# Patient Record
Sex: FEMALE | Race: WHITE | NOT HISPANIC OR LATINO | URBAN - METROPOLITAN AREA
[De-identification: names, ages, dates, MRNs, and addresses within clinical notes are randomized per-mention and may not be internally consistent; named-entity substitution may affect disease eponyms.]

---

## 2023-11-13 ENCOUNTER — OFFICE VISIT (OUTPATIENT)
Dept: FAMILY MEDICINE CLINIC | Facility: CLINIC | Age: 48
End: 2023-11-13
Payer: COMMERCIAL

## 2023-11-13 VITALS
HEIGHT: 66 IN | OXYGEN SATURATION: 98 % | WEIGHT: 180 LBS | SYSTOLIC BLOOD PRESSURE: 124 MMHG | DIASTOLIC BLOOD PRESSURE: 72 MMHG | RESPIRATION RATE: 16 BRPM | BODY MASS INDEX: 28.93 KG/M2 | HEART RATE: 82 BPM

## 2023-11-13 DIAGNOSIS — R55 SYNCOPE WITHOUT OTHER CARDIOVASCULAR SYMPTOMS: ICD-10-CM

## 2023-11-13 DIAGNOSIS — Z00.00 ENCOUNTER FOR ANNUAL PHYSICAL EXAM: Primary | ICD-10-CM

## 2023-11-13 DIAGNOSIS — E78.2 MIXED HYPERLIPIDEMIA: ICD-10-CM

## 2023-11-13 DIAGNOSIS — H81.12 BENIGN PAROXYSMAL POSITIONAL VERTIGO OF LEFT EAR: ICD-10-CM

## 2023-11-13 DIAGNOSIS — R09.89 SYNCOPE WITHOUT OTHER CARDIOVASCULAR SYMPTOMS: ICD-10-CM

## 2023-11-13 DIAGNOSIS — Z12.31 ENCOUNTER FOR SCREENING MAMMOGRAM FOR BREAST CANCER: ICD-10-CM

## 2023-11-13 DIAGNOSIS — E04.1 NODULE OF RIGHT LOBE OF THYROID GLAND: ICD-10-CM

## 2023-11-13 DIAGNOSIS — H81.13 BPV (BENIGN POSITIONAL VERTIGO), BILATERAL: ICD-10-CM

## 2023-11-13 PROBLEM — I49.9 CARDIAC DYSRHYTHMIA: Status: ACTIVE | Noted: 2023-11-13

## 2023-11-13 PROBLEM — Z90.710 H/O TOTAL HYSTERECTOMY: Status: ACTIVE | Noted: 2023-11-13

## 2023-11-13 PROBLEM — Z98.1 HISTORY OF FUSION OF CERVICAL SPINE: Status: ACTIVE | Noted: 2023-11-13

## 2023-11-13 PROCEDURE — 99203 OFFICE O/P NEW LOW 30 MIN: CPT | Performed by: FAMILY MEDICINE

## 2023-11-13 PROCEDURE — 99386 PREV VISIT NEW AGE 40-64: CPT | Performed by: FAMILY MEDICINE

## 2023-11-13 RX ORDER — MECLIZINE HCL 12.5 MG/1
12.5 TABLET ORAL 3 TIMES DAILY PRN
Qty: 30 TABLET | Refills: 1 | Status: SHIPPED | OUTPATIENT
Start: 2023-11-13

## 2023-11-13 RX ORDER — ATORVASTATIN CALCIUM 20 MG/1
20 TABLET, FILM COATED ORAL DAILY
COMMUNITY
Start: 2023-10-17

## 2023-11-13 NOTE — ASSESSMENT & PLAN NOTE
Patient has not had recent episodes.   Reportedly she had previous episodes which seem to be stress related and was told that her cardiac work-up was unremarkable even though she had occasional "skipped beats"    -We will have to request records from prior PCP as well as prior cardiac work-up to review

## 2023-11-13 NOTE — PROGRESS NOTES
Subjective:      Patient ID: Jossie Ferrera is a 46 y.o. female. 47-year-old female presents as new patient to the practice for annual physical examination as well as treatment of chronic conditions. Patient presents with her . Recently moved to the area from [place]. Patient is a former  with [employer]. She does have history of work-related trauma which resulted in cervical fusion. She eventually was working in [employer] instead of being on the streets but they had to retire from that line of work and moved to this area. She has somewhat vague history of having a right sided thyroid nodule that was reportedly too close to her carotid artery so they did not perform biopsy but were only following it with serial ultrasounds and reportedly it has not changed in size. Patient has history of total hysterectomy. In need of mammogram.  She does state that she will have episodes of vertigo with change in position and looking to the left side. Apparently her prior PCP had sent her for physical therapy and she was doing home therapy treatments with her own Frankey Fermo maneuvers but still ends up with episodes of nausea and dizziness with change in position. Patient does have history of hyperlipidemia and is on atorvastatin. Close to 1 year since her blood work. Patient reportedly had episodes of syncope sometime ago while in [place] and was wearing a Holter monitor. Reportedly she had some skipped beats but was otherwise told that her heart was "okay". No recent episodes. She felt that some of those episodes were triggered by stress as her  does have significant mental health issues        No past medical history on file. No family history on file. No past surgical history on file. reports that she has quit smoking. Her smoking use included cigarettes. She does not have any smokeless tobacco history on file.  She reports current alcohol use. She reports that she does not use drugs. Current Outpatient Medications:   •  atorvastatin (LIPITOR) 20 mg tablet, Take 20 mg by mouth daily, Disp: , Rfl:   •  meclizine (ANTIVERT) 12.5 MG tablet, Take 1 tablet (12.5 mg total) by mouth 3 (three) times a day as needed for dizziness, Disp: 30 tablet, Rfl: 1    The following portions of the patient's history were reviewed and updated as appropriate: allergies, current medications, past family history, past medical history, past social history, past surgical history and problem list.    Review of Systems   Constitutional: Negative. HENT: Negative. Eyes: Negative. Respiratory: Negative. Cardiovascular: Negative. Gastrointestinal: Negative. Endocrine: Negative. Genitourinary: Negative. Musculoskeletal: Negative. Decreased range of motion of the neck with fusion   Skin: Negative. Allergic/Immunologic: Negative. Neurological:  Positive for dizziness (With change in position) and light-headedness. Hematological: Negative. Psychiatric/Behavioral: Negative. Objective:    /72   Pulse 82   Resp 16   Ht 5' 6" (1.676 m)   Wt 81.6 kg (180 lb)   SpO2 98%   BMI 29.05 kg/m²      Physical Exam  Vitals and nursing note reviewed. Constitutional:       General: She is not in acute distress. Appearance: Normal appearance. She is well-developed and normal weight. She is not diaphoretic. HENT:      Head: Normocephalic and atraumatic. Right Ear: Tympanic membrane, ear canal and external ear normal.      Left Ear: Tympanic membrane, ear canal and external ear normal.      Nose: Nose normal.   Eyes:      Extraocular Movements: Extraocular movements intact. Conjunctiva/sclera: Conjunctivae normal.      Pupils: Pupils are equal, round, and reactive to light. Cardiovascular:      Rate and Rhythm: Normal rate and regular rhythm. Heart sounds: Normal heart sounds. No murmur heard.   Pulmonary: Effort: Pulmonary effort is normal.      Breath sounds: Normal breath sounds. Abdominal:      General: Bowel sounds are normal.      Palpations: Abdomen is soft. Musculoskeletal:         General: Normal range of motion. Cervical back: Normal range of motion and neck supple. Skin:     General: Skin is warm and dry. Findings: No rash. Neurological:      General: No focal deficit present. Mental Status: She is alert and oriented to person, place, and time. Mental status is at baseline. Deep Tendon Reflexes: Reflexes are normal and symmetric. Comments: Patient does have positive Hallpike Nikhil maneuver with looking to the left with left-sided nystagmus   Psychiatric:         Mood and Affect: Mood normal.         Behavior: Behavior normal.         Thought Content: Thought content normal.         Judgment: Judgment normal.           No results found for this or any previous visit (from the past 1008 hour(s)). Assessment/Plan:    Nodule of right lobe of thyroid gland  We will need to obtain prior ultrasound reports. Patient will be sent for ultrasound of the thyroid gland to determine new baseline    Benign paroxysmal positional vertigo of left ear  BPV of left ear. Easily fatigable after performing Hallpike Pyrites maneuver and looking to the left one time. We will place the patient on as needed meclizine as I do request records from prior PCP for work-up that had been done    Mixed hyperlipidemia  On atorvastatin 20 mg once daily. Check lipid panel. I will assume management of her atorvastatin    Encounter for screening mammogram for breast cancer  Screening mammogram ordered    Encounter for annual physical exam  Screening blood work ordered including CBC, TSH. She does not need Pap smear as she reportedly had a total hysterectomy    Syncope without other cardiovascular symptoms  Patient has not had recent episodes.   Reportedly she had previous episodes which seem to be stress related and was told that her cardiac work-up was unremarkable even though she had occasional "skipped beats"    -We will have to request records from prior PCP as well as prior cardiac work-up to review          Problem List Items Addressed This Visit        Endocrine    Nodule of right lobe of thyroid gland     We will need to obtain prior ultrasound reports. Patient will be sent for ultrasound of the thyroid gland to determine new baseline         Relevant Orders    TSH, 3rd generation with Free T4 reflex    US thyroid       Nervous and Auditory    Benign paroxysmal positional vertigo of left ear     BPV of left ear. Easily fatigable after performing Hallpike Melcroft maneuver and looking to the left one time. We will place the patient on as needed meclizine as I do request records from prior PCP for work-up that had been done         Relevant Medications    meclizine (ANTIVERT) 12.5 MG tablet       Other    Encounter for annual physical exam - Primary     Screening blood work ordered including CBC, TSH. She does not need Pap smear as she reportedly had a total hysterectomy         Relevant Orders    CBC and differential    Encounter for screening mammogram for breast cancer     Screening mammogram ordered         Relevant Orders    Mammo screening bilateral w cad    Mixed hyperlipidemia     On atorvastatin 20 mg once daily. Check lipid panel. I will assume management of her atorvastatin         Relevant Medications    atorvastatin (LIPITOR) 20 mg tablet    Other Relevant Orders    Comprehensive metabolic panel    Lipid panel    Syncope without other cardiovascular symptoms     Patient has not had recent episodes.   Reportedly she had previous episodes which seem to be stress related and was told that her cardiac work-up was unremarkable even though she had occasional "skipped beats"    -We will have to request records from prior PCP as well as prior cardiac work-up to review

## 2023-11-13 NOTE — ASSESSMENT & PLAN NOTE
Screening blood work ordered including CBC, TSH.   She does not need Pap smear as she reportedly had a total hysterectomy

## 2023-11-13 NOTE — ASSESSMENT & PLAN NOTE
We will need to obtain prior ultrasound reports.   Patient will be sent for ultrasound of the thyroid gland to determine new baseline

## 2023-11-13 NOTE — ASSESSMENT & PLAN NOTE
BPV of left ear. Easily fatigable after performing Hallpike Hartford maneuver and looking to the left one time.   We will place the patient on as needed meclizine as I do request records from prior PCP for work-up that had been done

## 2023-11-20 ENCOUNTER — HOSPITAL ENCOUNTER (OUTPATIENT)
Dept: ULTRASOUND IMAGING | Facility: HOSPITAL | Age: 48
Discharge: HOME | End: 2023-11-20
Payer: COMMERCIAL

## 2023-11-20 DIAGNOSIS — E04.1 NODULE OF RIGHT LOBE OF THYROID GLAND: ICD-10-CM

## 2023-11-20 PROCEDURE — 76536 US EXAM OF HEAD AND NECK: CPT

## 2023-11-27 ENCOUNTER — TELEPHONE (OUTPATIENT)
Dept: FAMILY MEDICINE CLINIC | Facility: CLINIC | Age: 48
End: 2023-11-27

## 2023-11-27 NOTE — TELEPHONE ENCOUNTER
----- Message from Shannan Espana DO sent at 11/27/2023  8:52 AM EST -----  Please call the patient regarding her abnormal result. Thyroid ultrasound shows 2 left-sided thyroid nodules neither of which meet criteria for biopsy.   She is recommended to have repeat thyroid ultrasound performed in 1 year to continue to monitor

## 2023-11-30 LAB
ALBUMIN SERPL-MCNC: 4.6 G/DL (ref 3.6–5.1)
ALBUMIN/GLOB SERPL: 2 (CALC) (ref 1–2.5)
ALP SERPL-CCNC: 60 U/L (ref 37–153)
ALT SERPL-CCNC: 25 U/L (ref 6–29)
AST SERPL-CCNC: 19 U/L (ref 10–35)
BASOPHILS # BLD AUTO: 59 CELLS/UL (ref 0–200)
BASOPHILS NFR BLD AUTO: 1 %
BILIRUB SERPL-MCNC: 0.7 MG/DL (ref 0.2–1.2)
BUN SERPL-MCNC: 17 MG/DL (ref 7–25)
BUN/CREAT SERPL: NORMAL (CALC) (ref 6–22)
CALCIUM SERPL-MCNC: 9.5 MG/DL (ref 8.6–10.4)
CHLORIDE SERPL-SCNC: 107 MMOL/L (ref 98–110)
CHOLEST SERPL-MCNC: 167 MG/DL
CHOLEST/HDLC SERPL: 2.9 (CALC)
CO2 SERPL-SCNC: 27 MMOL/L (ref 20–32)
CREAT SERPL-MCNC: 0.83 MG/DL (ref 0.5–1.03)
EOSINOPHIL # BLD AUTO: 130 CELLS/UL (ref 15–500)
EOSINOPHIL NFR BLD AUTO: 2.2 %
ERYTHROCYTE [DISTWIDTH] IN BLOOD BY AUTOMATED COUNT: 13.1 % (ref 11–15)
GFR/BSA.PRED SERPLBLD CYS-BASED-ARV: 86 ML/MIN/1.73M2
GLOBULIN SER CALC-MCNC: 2.3 G/DL (CALC) (ref 1.9–3.7)
GLUCOSE SERPL-MCNC: 99 MG/DL (ref 65–99)
HCT VFR BLD AUTO: 42.6 % (ref 35–45)
HDLC SERPL-MCNC: 58 MG/DL
HGB BLD-MCNC: 14.1 G/DL (ref 11.7–15.5)
LDLC SERPL CALC-MCNC: 85 MG/DL (CALC)
LYMPHOCYTES # BLD AUTO: 1906 CELLS/UL (ref 850–3900)
LYMPHOCYTES NFR BLD AUTO: 32.3 %
MCH RBC QN AUTO: 28.2 PG (ref 27–33)
MCHC RBC AUTO-ENTMCNC: 33.1 G/DL (ref 32–36)
MCV RBC AUTO: 85.2 FL (ref 80–100)
MONOCYTES # BLD AUTO: 507 CELLS/UL (ref 200–950)
MONOCYTES NFR BLD AUTO: 8.6 %
NEUTROPHILS # BLD AUTO: 3298 CELLS/UL (ref 1500–7800)
NEUTROPHILS NFR BLD AUTO: 55.9 %
NONHDLC SERPL-MCNC: 109 MG/DL (CALC)
PLATELET # BLD AUTO: 230 THOUSAND/UL (ref 140–400)
PMV BLD REES-ECKER: 10.7 FL (ref 7.5–12.5)
POTASSIUM SERPL-SCNC: 4.7 MMOL/L (ref 3.5–5.3)
PROT SERPL-MCNC: 6.9 G/DL (ref 6.1–8.1)
RBC # BLD AUTO: 5 MILLION/UL (ref 3.8–5.1)
SODIUM SERPL-SCNC: 140 MMOL/L (ref 135–146)
TRIGL SERPL-MCNC: 138 MG/DL
TSH SERPL-ACNC: 2.29 MIU/L
WBC # BLD AUTO: 5.9 THOUSAND/UL (ref 3.8–10.8)

## 2023-12-14 ENCOUNTER — RA CDI HCC (OUTPATIENT)
Dept: OTHER | Facility: HOSPITAL | Age: 48
End: 2023-12-14

## 2023-12-14 NOTE — PROGRESS NOTES
[address] [facility] coding opportunities       Chart reviewed, no opportunity found: CHART REVIEWED, NO OPPORTUNITY FOUND        Patients Insurance        Commercial Insurance: [employer]

## 2024-01-08 DIAGNOSIS — E78.2 MIXED HYPERLIPIDEMIA: Primary | ICD-10-CM

## 2024-01-08 RX ORDER — ATORVASTATIN CALCIUM 20 MG/1
20 TABLET, FILM COATED ORAL DAILY
Qty: 30 TABLET | Refills: 1 | Status: SHIPPED | OUTPATIENT
Start: 2024-01-08 | End: 2024-01-18 | Stop reason: SDUPTHER

## 2024-01-08 NOTE — TELEPHONE ENCOUNTER
Reason for call:   [x] Refill   [] Prior Auth  [] Other:     Office: patient sees Dr Pierce now [facility]  [x] PCP/Provider - Chaim Pierce,   Family Medicine  [] Specialty/Provider -     Medication:   atorvastatin (LIPITOR) 20 mg tablet   Dose: 20 mg Route: Oral Frequency: Daily  Sig: Take 20 mg by mouth daily      [facility]      Does the patient have enough for 3 days?   [] Yes   [x] No - Send as HP to POD

## 2024-01-18 ENCOUNTER — OFFICE VISIT (OUTPATIENT)
Dept: FAMILY MEDICINE CLINIC | Facility: CLINIC | Age: 49
End: 2024-01-18
Payer: COMMERCIAL

## 2024-01-18 VITALS
HEIGHT: 66 IN | HEART RATE: 75 BPM | DIASTOLIC BLOOD PRESSURE: 80 MMHG | WEIGHT: 180 LBS | OXYGEN SATURATION: 98 % | BODY MASS INDEX: 28.93 KG/M2 | SYSTOLIC BLOOD PRESSURE: 128 MMHG

## 2024-01-18 DIAGNOSIS — Z23 FLU VACCINE NEED: ICD-10-CM

## 2024-01-18 DIAGNOSIS — Z01.419 WELL FEMALE EXAM WITH ROUTINE GYNECOLOGICAL EXAM: ICD-10-CM

## 2024-01-18 DIAGNOSIS — H81.12 BENIGN PAROXYSMAL POSITIONAL VERTIGO OF LEFT EAR: ICD-10-CM

## 2024-01-18 DIAGNOSIS — N94.10 DYSPAREUNIA IN FEMALE: ICD-10-CM

## 2024-01-18 DIAGNOSIS — Z90.710 H/O TOTAL HYSTERECTOMY: ICD-10-CM

## 2024-01-18 DIAGNOSIS — Z23 ENCOUNTER FOR IMMUNIZATION: ICD-10-CM

## 2024-01-18 DIAGNOSIS — E78.2 MIXED HYPERLIPIDEMIA: ICD-10-CM

## 2024-01-18 DIAGNOSIS — E89.40 SURGICAL MENOPAUSE: ICD-10-CM

## 2024-01-18 DIAGNOSIS — Z86.010 PERSONAL HISTORY OF COLONIC POLYPS: ICD-10-CM

## 2024-01-18 DIAGNOSIS — E04.1 NODULE OF RIGHT LOBE OF THYROID GLAND: Primary | ICD-10-CM

## 2024-01-18 PROBLEM — Z86.0100 PERSONAL HISTORY OF COLONIC POLYPS: Status: ACTIVE | Noted: 2024-01-18

## 2024-01-18 PROBLEM — R55 SYNCOPE WITHOUT OTHER CARDIOVASCULAR SYMPTOMS: Status: RESOLVED | Noted: 2023-11-13 | Resolved: 2024-01-18

## 2024-01-18 PROBLEM — R09.89 SYNCOPE WITHOUT OTHER CARDIOVASCULAR SYMPTOMS: Status: RESOLVED | Noted: 2023-11-13 | Resolved: 2024-01-18

## 2024-01-18 PROCEDURE — 99214 OFFICE O/P EST MOD 30 MIN: CPT | Performed by: FAMILY MEDICINE

## 2024-01-18 PROCEDURE — 90471 IMMUNIZATION ADMIN: CPT | Performed by: FAMILY MEDICINE

## 2024-01-18 PROCEDURE — 90686 IIV4 VACC NO PRSV 0.5 ML IM: CPT | Performed by: FAMILY MEDICINE

## 2024-01-18 RX ORDER — ESTRADIOL 0.1 MG/G
1 CREAM VAGINAL DAILY
Qty: 42.5 G | Refills: 3 | Status: SHIPPED | OUTPATIENT
Start: 2024-01-18

## 2024-01-18 RX ORDER — ATORVASTATIN CALCIUM 20 MG/1
20 TABLET, FILM COATED ORAL DAILY
Qty: 90 TABLET | Refills: 3 | Status: SHIPPED | OUTPATIENT
Start: 2024-01-18

## 2024-01-18 NOTE — ASSESSMENT & PLAN NOTE
Reportedly the patient believes that she still has a cervix.  She will be referred to gynecology for Pap smear

## 2024-01-18 NOTE — ASSESSMENT & PLAN NOTE
Patient complains of vaginal dryness since having hysterectomy.  Vaginal estradiol cream prescribed

## 2024-01-18 NOTE — ASSESSMENT & PLAN NOTE
Last colonoscopy was performed in 2019.  According to that report she will be due for repeat colonoscopy this year.  Order placed for gastroenterology

## 2024-01-18 NOTE — ASSESSMENT & PLAN NOTE
Cholesterol remains very well-controlled on atorvastatin 20 mg once daily.  Repeat lipid panel to be done in 6 months

## 2024-01-18 NOTE — PROGRESS NOTES
Subjective:      Patient ID: Winnie Lester is a 48 y.o. female.    48-year-old female presents with her  for follow-up in regards to chronic conditions.  Patient did have lab work completed which has been reviewed.  Normal TSH, today, CMP and very well-controlled lipid profile total cholesterol 167, HDL 58, LDL 85 on atorvastatin 20 mg once daily.  She will need refills.  We were able to get records from previous PCP and specialists through care everywhere in [facility].  Last colonoscopy was performed in 2019.  She does have personal history of colon polyps and was recommended to have repeat colonoscopy in 5 years which would be now.  Patient is scheduled for mammogram.  Patient is eligible for shingles vaccination and flu vaccine.  Patient would be willing to receive flu vaccine today.  Patient believes that she still has a cervix.  She does complain of vaginal dryness and painful sexual intercourse.  Reportedly she had testosterone pellets from previous gynecologist that did help with her vaginal dryness however she experienced hair loss.  She is not certain when her last Pap smear would have been.  Patient to figure out that her vertiginous symptoms were being caused by foam earplugs that she has been pressing into both of her ears to help reduce noise when her  was listening to the television.  She has not experienced vertigo since she stopped using those earplugs        No past medical history on file.    No family history on file.    No past surgical history on file.     reports that she has quit smoking. Her smoking use included cigarettes. She does not have any smokeless tobacco history on file. She reports current alcohol use. She reports that she does not use drugs.      Current Outpatient Medications:   •  atorvastatin (LIPITOR) 20 mg tablet, Take 1 tablet (20 mg total) by mouth daily, Disp: 90 tablet, Rfl: 3  •  estradiol (ESTRACE VAGINAL) 0.1 mg/g vaginal cream, Insert 1 g into the vagina  "daily, Disp: 42.5 g, Rfl: 3    The following portions of the patient's history were reviewed and updated as appropriate: allergies, current medications, past family history, past medical history, past social history, past surgical history and problem list.    Review of Systems   Constitutional: Negative.    HENT: Negative.     Eyes: Negative.    Respiratory: Negative.     Cardiovascular: Negative.    Gastrointestinal: Negative.    Endocrine: Negative.    Genitourinary:  Positive for dyspareunia. Negative for pelvic pain, vaginal bleeding and vaginal discharge.   Musculoskeletal: Negative.    Skin: Negative.    Allergic/Immunologic: Negative.    Neurological: Negative.    Hematological: Negative.    Psychiatric/Behavioral: Negative.             Objective:    /80   Pulse 75   Ht 5' 6\" (1.676 m)   Wt 81.6 kg (180 lb)   SpO2 98%   BMI 29.05 kg/m²      Physical Exam  Vitals and nursing note reviewed.   Constitutional:       General: She is not in acute distress.     Appearance: Normal appearance. She is well-developed and normal weight. She is not diaphoretic.   HENT:      Head: Normocephalic and atraumatic.   Eyes:      Extraocular Movements: Extraocular movements intact.      Conjunctiva/sclera: Conjunctivae normal.      Pupils: Pupils are equal, round, and reactive to light.   Cardiovascular:      Rate and Rhythm: Normal rate and regular rhythm.      Pulses: Normal pulses.      Heart sounds: Normal heart sounds. No murmur heard.  Pulmonary:      Effort: Pulmonary effort is normal.      Breath sounds: Normal breath sounds.   Abdominal:      General: Bowel sounds are normal.      Palpations: Abdomen is soft.   Musculoskeletal:         General: Normal range of motion.      Cervical back: Normal range of motion and neck supple.   Skin:     General: Skin is warm and dry.      Findings: No rash.   Neurological:      General: No focal deficit present.      Mental Status: She is alert and oriented to person, place, " and time. Mental status is at baseline.      Deep Tendon Reflexes: Reflexes are normal and symmetric.   Psychiatric:         Mood and Affect: Mood normal.         Behavior: Behavior normal.         Thought Content: Thought content normal.         Judgment: Judgment normal.           Recent Results (from the past 3360 hour(s))   Lipid panel    Collection Time: 11/30/23  8:49 AM   Result Value Ref Range    Total Cholesterol 167 <200 mg/dL    HDL 58 > OR = 50 mg/dL    Triglycerides 138 <150 mg/dL    LDL Calculated 85 mg/dL (calc)    Chol HDLC Ratio 2.9 <5.0 (calc)    Non-HDL Cholesterol 109 <130 mg/dL (calc)   Comprehensive metabolic panel    Collection Time: 11/30/23  8:49 AM   Result Value Ref Range    Glucose, Random 99 65 - 99 mg/dL    BUN 17 7 - 25 mg/dL    Creatinine 0.83 0.50 - 1.03 mg/dL    eGFR 86 > OR = 60 mL/min/1.73m2    SL AMB BUN/CREATININE RATIO SEE NOTE: 6 - 22 (calc)    Sodium 140 135 - 146 mmol/L    Potassium 4.7 3.5 - 5.3 mmol/L    Chloride 107 98 - 110 mmol/L    CO2 27 20 - 32 mmol/L    Calcium 9.5 8.6 - 10.4 mg/dL    Protein, Total 6.9 6.1 - 8.1 g/dL    Albumin 4.6 3.6 - 5.1 g/dL    Globulin 2.3 1.9 - 3.7 g/dL (calc)    Albumin/Globulin Ratio 2.0 1.0 - 2.5 (calc)    TOTAL BILIRUBIN 0.7 0.2 - 1.2 mg/dL    Alkaline Phosphatase 60 37 - 153 U/L    AST 19 10 - 35 U/L    ALT 25 6 - 29 U/L   CBC and differential    Collection Time: 11/30/23  8:49 AM   Result Value Ref Range    White Blood Cell Count 5.9 3.8 - 10.8 Thousand/uL    Red Blood Cell Count 5.00 3.80 - 5.10 Million/uL    Hemoglobin 14.1 11.7 - 15.5 g/dL    HCT 42.6 35.0 - 45.0 %    MCV 85.2 80.0 - 100.0 fL    MCH 28.2 27.0 - 33.0 pg    MCHC 33.1 32.0 - 36.0 g/dL    RDW 13.1 11.0 - 15.0 %    Platelet Count 230 140 - 400 Thousand/uL    SL AMB MPV 10.7 7.5 - 12.5 fL    Neutrophils (Absolute) 3,298 1,500 - 7,800 cells/uL    Lymphocytes (Absolute) 1,906 850 - 3,900 cells/uL    Monocytes (Absolute) 507 200 - 950 cells/uL    Eosinophils (Absolute) 130  15 - 500 cells/uL    Basophils ABS 59 0 - 200 cells/uL    Neutrophils 55.9 %    Lymphocytes 32.3 %    Monocytes 8.6 %    Eosinophils 2.2 %    Basophils PCT 1.0 %   TSH, 3rd generation with Free T4 reflex    Collection Time: 11/30/23  8:49 AM   Result Value Ref Range    TSH W/RFX TO FREE T4 2.29 mIU/L        Assessment/Plan:    Nodule of right lobe of thyroid gland  Thyroid ultrasound shows 2 small thyroid nodules none of which meet criteria for biopsy.  In comparing to prior thyroid ultrasound there has been little change.  Repeat thyroid ultrasound will be done in 1 year    Mixed hyperlipidemia  Cholesterol remains very well-controlled on atorvastatin 20 mg once daily.  Repeat lipid panel to be done in 6 months    Benign paroxysmal positional vertigo of left ear  Patient had actually figured out that she was developing vertigo due to earplugs that she was wearing and has not had episode of vertigo since she stopped using this earplugs    Well female exam with routine gynecological exam  Reportedly the patient believes that she still has a cervix.  She will be referred to gynecology for Pap smear    Surgical menopause  Patient had both ovaries removed when she had hysterectomy    Personal history of colonic polyps  Last colonoscopy was performed in 2019.  According to that report she will be due for repeat colonoscopy this year.  Order placed for gastroenterology    H/O total hysterectomy  Supracervical hysterectomy?  Referral to gynecology for evaluation.  If she still has a cervix then she needs to have a Pap smear performed    Flu vaccine need  Patient will be willing to receive flu vaccination.  Provided in the office    Dyspareunia in female  Patient complains of vaginal dryness since having hysterectomy.  Vaginal estradiol cream prescribed          Problem List Items Addressed This Visit        Endocrine    Nodule of right lobe of thyroid gland - Primary     Thyroid ultrasound shows 2 small thyroid nodules  none of which meet criteria for biopsy.  In comparing to prior thyroid ultrasound there has been little change.  Repeat thyroid ultrasound will be done in 1 year            Nervous and Auditory    Benign paroxysmal positional vertigo of left ear     Patient had actually figured out that she was developing vertigo due to earplugs that she was wearing and has not had episode of vertigo since she stopped using this earplugs            Other    Dyspareunia in female     Patient complains of vaginal dryness since having hysterectomy.  Vaginal estradiol cream prescribed         Relevant Medications    estradiol (ESTRACE VAGINAL) 0.1 mg/g vaginal cream    Flu vaccine need     Patient will be willing to receive flu vaccination.  Provided in the office         H/O total hysterectomy     Supracervical hysterectomy?  Referral to gynecology for evaluation.  If she still has a cervix then she needs to have a Pap smear performed         Mixed hyperlipidemia     Cholesterol remains very well-controlled on atorvastatin 20 mg once daily.  Repeat lipid panel to be done in 6 months         Relevant Medications    atorvastatin (LIPITOR) 20 mg tablet    Other Relevant Orders    Comprehensive metabolic panel    Lipid panel    Personal history of colonic polyps     Last colonoscopy was performed in 2019.  According to that report she will be due for repeat colonoscopy this year.  Order placed for gastroenterology         Relevant Orders    Ambulatory Referral to Gastroenterology    Surgical menopause     Patient had both ovaries removed when she had hysterectomy         Well female exam with routine gynecological exam     Reportedly the patient believes that she still has a cervix.  She will be referred to gynecology for Pap smear         Relevant Orders    Ambulatory Referral to Gynecology   Other Visit Diagnoses     Encounter for immunization        Relevant Orders    influenza vaccine, quadrivalent, 0.5 mL, preservative-free, for adult  and pediatric patients 6 mos+ (AFLURIA, FLUARIX, FLULAVAL, FLUZONE) (Completed)

## 2024-01-18 NOTE — ASSESSMENT & PLAN NOTE
Supracervical hysterectomy?  Referral to gynecology for evaluation.  If she still has a cervix then she needs to have a Pap smear performed

## 2024-01-18 NOTE — ASSESSMENT & PLAN NOTE
Patient had actually figured out that she was developing vertigo due to earplugs that she was wearing and has not had episode of vertigo since she stopped using this earplugs

## 2024-01-18 NOTE — ASSESSMENT & PLAN NOTE
Thyroid ultrasound shows 2 small thyroid nodules none of which meet criteria for biopsy.  In comparing to prior thyroid ultrasound there has been little change.  Repeat thyroid ultrasound will be done in 1 year

## 2024-01-19 ENCOUNTER — OFFICE VISIT (OUTPATIENT)
Dept: GASTROENTEROLOGY | Facility: CLINIC | Age: 49
End: 2024-01-19
Payer: COMMERCIAL

## 2024-01-19 VITALS
BODY MASS INDEX: 30.57 KG/M2 | HEART RATE: 92 BPM | SYSTOLIC BLOOD PRESSURE: 131 MMHG | HEIGHT: 66 IN | DIASTOLIC BLOOD PRESSURE: 75 MMHG | WEIGHT: 190.2 LBS

## 2024-01-19 DIAGNOSIS — Z86.010 HISTORY OF COLON POLYPS: ICD-10-CM

## 2024-01-19 DIAGNOSIS — R93.2 ABNORMAL CT OF LIVER: ICD-10-CM

## 2024-01-19 DIAGNOSIS — Z86.010 PERSONAL HISTORY OF COLONIC POLYPS: ICD-10-CM

## 2024-01-19 DIAGNOSIS — R13.10 DYSPHAGIA, UNSPECIFIED TYPE: Primary | ICD-10-CM

## 2024-01-19 DIAGNOSIS — K76.9 LIVER LESION: ICD-10-CM

## 2024-01-19 DIAGNOSIS — Q45.3 PANCREATIC ABNORMALITY: ICD-10-CM

## 2024-01-19 PROCEDURE — 99204 OFFICE O/P NEW MOD 45 MIN: CPT | Performed by: PHYSICIAN ASSISTANT

## 2024-01-19 NOTE — PROGRESS NOTES
[facility] - Outpatient Consultation  Winnie Lester 48 y.o. female MRN: [id number]  Encounter: [id number]          ASSESSMENT AND PLAN:      Pleasant 48-year-old female with history of hyperlipidemia who presents the office as a new patient for history of colon polyps.    History of colon polyps  Last colonoscopy around 4/2019 with repeat recommended in 5 years due to history of polyps, none removed at that time.  Recent hgb normal.  No family history of colon cancer.    -Schedule colonoscopy  -Discussed the risks of the procedure including bleeding, infection and perforation.  - MiraLAX/Dulcolax bowel prep per patient preference    2. Dysphagia  Patient reports after having C5/C6 fusion having some difficulty swallowing if she is not looking straight ahead.  Otherwise she is doing okay.  No prior EGD.  Suspect this is in the setting of her prior surgery, question possible osteophytes, appears unlikely esophageal etiology.    -Will start barium swallow for evaluation.  If any significant abnormalities discussed adding on EGD with colonoscopy.  - Otherwise, she does not have any symptoms if she swallows while looking straight forward and would likely hold off on further evaluation.  Patient is agreeable to plan.    3.  Abnormal CT scan, liver lesions  4.  Abnormal CT scan, pancreas  Most recent abdominal imaging with CT scan from 2013 which showed multiple liver lesions, likely cysts.  Most recent liver enzymes normal.  There was also reports of edematous appearing pancreas.  She denies any known history of pancreatitis.  Her weight has been increasing.  No concerning symptoms.    -Findings were noted over 10 years ago.  She has not had any rise in liver enzymes, episodes of pancreatitis, weight loss, etc.  - At this time we will start with abdominal ultrasound to further assess liver lesions and see if any concerning mentions of the pancreas.  I discussed with patient and family that  this is not good imaging for the pancreas.  However if any concerning features would proceed with MRI.  Otherwise likely would hold off on further evaluation.      Patient was recommended to follow up after procedure. Patient was recommended to reach out via [employer] with any questions or concerns in the meantime.   ______________________________________________________________________    HPI:      Winnie Lester is a 48 y.o. female with hyperlipidemia who presents the office as a new patient for history of colon polyps.  Patient presents with her .  They recently moved from [facility].    Patient reports doing well.  She is having regular bowel movements, no abdominal pain no blood in stool.  Denies any unintentional weight loss.  She denies any reflux, nausea, vomiting.  She reports previously having a C5-C6 fusion which has caused her to not be able to swallow when her head is turned a certain way.  If she swallows straight on she has no symptoms of dysphagia.  Denies any prior workup for this.    No recent abdominal imaging noted.  I was able to find a CT from 2013 which reported multiple liver lesions likely cysts, enlarged appearing pancreas.  She denies any history of pancreatitis.  She denies any prior workup for this.    Most recent lab work 2 months ago with normal CBC, CMP and TSH.    No family history of colon cancer though reports suspected family history of polyps.    Reports last colonoscopy in 2019 and repeat recommended in 5 years due to history of polyps.     REVIEW OF SYSTEMS:      CONSTITUTIONAL: Denies any fever, chills, rigors, and weight loss.  HEENT: No earache or tinnitus. Denies hearing loss or visual disturbances.  CARDIOVASCULAR: No chest pain or palpitations.   RESPIRATORY: Denies any cough, hemoptysis, shortness of breath or dyspnea on exertion.  GASTROINTESTINAL: As noted in the History of Present Illness.   GENITOURINARY: No problems with urination. Denies any hematuria or  "dysuria.  NEUROLOGIC: No dizziness or vertigo, denies headaches.   MUSCULOSKELETAL: Denies any muscle or joint pain.   SKIN: Denies skin rashes or itching.   ENDOCRINE: Denies excessive thirst. Denies intolerance to heat or cold.  PSYCHOSOCIAL: Denies depression or anxiety. Denies any recent memory loss.       Historical Information   History reviewed. No pertinent past medical history.  Past Surgical History:   Procedure Laterality Date    APPENDECTOMY      COLONOSCOPY      HYSTERECTOMY       Social History   Social History     Substance and Sexual Activity   Alcohol Use Yes    Comment: 1-2 drinks monthly, Occasional social drinker     Social History     Substance and Sexual Activity   Drug Use Never     Social History     Tobacco Use   Smoking Status Former    Current packs/day: 0.00    Average packs/day: 1 pack/day for 5.0 years (5.0 ttl pk-yrs)    Types: Cigarettes    Quit date: 1995    Years since quittin.0   Smokeless Tobacco Never   Tobacco Comments    Haven't smoked for over 30 years     Family History   Problem Relation Age of Onset    Cancer Father         bladder    Bone cancer Paternal Grandmother        Meds/Allergies       Current Outpatient Medications:     atorvastatin (LIPITOR) 20 mg tablet    estradiol (ESTRACE VAGINAL) 0.1 mg/g vaginal cream    Allergies   Allergen Reactions    Hydrocodone Nausea Only    Medical Tape Rash     Tagederm - rashes    Nickel Rash           Objective     Blood pressure 131/75, pulse 92, height 5' 6\" (1.676 m), weight 86.3 kg (190 lb 3.2 oz). Body mass index is 30.7 kg/m².        PHYSICAL EXAM:      General Appearance:   Alert, cooperative, no distress   HEENT:   Normocephalic, atraumatic, anicteric.     Neck:  Supple, symmetrical, trachea midline   Lungs:   Clear to auscultation bilaterally; no rales, rhonchi or wheezing; respirations unlabored    Heart::   Regular rate and rhythm; no murmur, rub, or gallop.   Abdomen:   Soft, non-tender, non-distended; " normal bowel sounds; no masses, no organomegaly. Benign abdomen.    Genitalia:   Deferred    Rectal:   Deferred    Extremities:  No cyanosis, clubbing or edema    Pulses:  2+ and symmetric    Skin:  No jaundice, rashes, or lesions    Lymph nodes:  No palpable cervical lymphadenopathy        Lab Results:   No visits with results within 1 Day(s) from this visit.   Latest known visit with results is:   Orders Only on 11/30/2023   Component Date Value    Total Cholesterol 11/30/2023 167     HDL 11/30/2023 58     Triglycerides 11/30/2023 138     LDL Calculated 11/30/2023 85     Chol HDLC Ratio 11/30/2023 2.9     Non-HDL Cholesterol 11/30/2023 109     Glucose, Random 11/30/2023 99     BUN 11/30/2023 17     Creatinine 11/30/2023 0.83     eGFR 11/30/2023 86     SL AMB BUN/CREATININE RA* 11/30/2023 SEE NOTE:     Sodium 11/30/2023 140     Potassium 11/30/2023 4.7     Chloride 11/30/2023 107     CO2 11/30/2023 27     Calcium 11/30/2023 9.5     Protein, Total 11/30/2023 6.9     Albumin 11/30/2023 4.6     Globulin 11/30/2023 2.3     Albumin/Globulin Ratio 11/30/2023 2.0     TOTAL BILIRUBIN 11/30/2023 0.7     Alkaline Phosphatase 11/30/2023 60     AST 11/30/2023 19     ALT 11/30/2023 25     White Blood Cell Count 11/30/2023 5.9     Red Blood Cell Count 11/30/2023 5.00     Hemoglobin 11/30/2023 14.1     HCT 11/30/2023 42.6     MCV 11/30/2023 85.2     MCH 11/30/2023 28.2     MCHC 11/30/2023 33.1     RDW 11/30/2023 13.1     Platelet Count 11/30/2023 230     SL AMB MPV 11/30/2023 10.7     Neutrophils (Absolute) 11/30/2023 3,298     Lymphocytes (Absolute) 11/30/2023 1,906     Monocytes (Absolute) 11/30/2023 507     Eosinophils (Absolute) 11/30/2023 130     Basophils ABS 11/30/2023 59     Neutrophils 11/30/2023 55.9     Lymphocytes 11/30/2023 32.3     Monocytes 11/30/2023 8.6     Eosinophils 11/30/2023 2.2     Basophils PCT 11/30/2023 1.0     TSH W/RFX TO FREE T4 11/30/2023 2.29          Radiology Results:   No results found.

## 2024-01-19 NOTE — PATIENT INSTRUCTIONS
Scheduled date of colonoscopy (as of today):4/15/24  Physician performing colonoscopy: Shadi  Location of colonoscopy: [facility]  Bowel prep reviewed with patient: Miralax  Instructions reviewed with patient by: Misa  Clearances: none

## 2024-01-31 ENCOUNTER — HOSPITAL ENCOUNTER (OUTPATIENT)
Dept: RADIOLOGY | Facility: HOSPITAL | Age: 49
Discharge: HOME | End: 2024-01-31
Payer: COMMERCIAL

## 2024-01-31 DIAGNOSIS — Q45.3 PANCREATIC ABNORMALITY: ICD-10-CM

## 2024-01-31 DIAGNOSIS — R93.2 ABNORMAL CT OF LIVER: ICD-10-CM

## 2024-01-31 DIAGNOSIS — K76.9 LIVER LESION: ICD-10-CM

## 2024-01-31 DIAGNOSIS — R13.10 DYSPHAGIA, UNSPECIFIED TYPE: ICD-10-CM

## 2024-01-31 PROCEDURE — 76705 ECHO EXAM OF ABDOMEN: CPT

## 2024-01-31 PROCEDURE — 74220 X-RAY XM ESOPHAGUS 1CNTRST: CPT

## 2024-02-14 ENCOUNTER — APPOINTMENT (EMERGENCY)
Dept: RADIOLOGY | Facility: HOSPITAL | Age: 49
End: 2024-02-14
Payer: COMMERCIAL

## 2024-02-14 ENCOUNTER — HOSPITAL ENCOUNTER (EMERGENCY)
Facility: HOSPITAL | Age: 49
Discharge: HOME | End: 2024-02-14
Attending: EMERGENCY MEDICINE
Payer: COMMERCIAL

## 2024-02-14 ENCOUNTER — NURSE TRIAGE (OUTPATIENT)
Age: 49
End: 2024-02-14

## 2024-02-14 VITALS
WEIGHT: 180 LBS | RESPIRATION RATE: 18 BRPM | HEIGHT: 66 IN | TEMPERATURE: 98.3 F | SYSTOLIC BLOOD PRESSURE: 130 MMHG | BODY MASS INDEX: 28.93 KG/M2 | DIASTOLIC BLOOD PRESSURE: 60 MMHG | HEART RATE: 85 BPM | OXYGEN SATURATION: 98 %

## 2024-02-14 DIAGNOSIS — S39.012A STRAIN OF LUMBAR REGION, INITIAL ENCOUNTER: ICD-10-CM

## 2024-02-14 DIAGNOSIS — M54.50 LOW BACK PAIN: Primary | ICD-10-CM

## 2024-02-14 PROCEDURE — 99284 EMERGENCY DEPT VISIT MOD MDM: CPT | Performed by: EMERGENCY MEDICINE

## 2024-02-14 PROCEDURE — 99283 EMERGENCY DEPT VISIT LOW MDM: CPT

## 2024-02-14 PROCEDURE — 96372 THER/PROPH/DIAG INJ SC/IM: CPT

## 2024-02-14 PROCEDURE — 72100 X-RAY EXAM L-S SPINE 2/3 VWS: CPT

## 2024-02-14 RX ORDER — DIAZEPAM 5 MG/ML
2.5 INJECTION, SOLUTION INTRAMUSCULAR; INTRAVENOUS ONCE
Status: DISCONTINUED | OUTPATIENT
Start: 2024-02-14 | End: 2024-02-14

## 2024-02-14 RX ORDER — KETOROLAC TROMETHAMINE 30 MG/ML
15 INJECTION, SOLUTION INTRAMUSCULAR; INTRAVENOUS ONCE
Status: DISCONTINUED | OUTPATIENT
Start: 2024-02-14 | End: 2024-02-14

## 2024-02-14 RX ORDER — KETOROLAC TROMETHAMINE 30 MG/ML
30 INJECTION, SOLUTION INTRAMUSCULAR; INTRAVENOUS ONCE
Status: COMPLETED | OUTPATIENT
Start: 2024-02-14 | End: 2024-02-14

## 2024-02-14 RX ORDER — LIDOCAINE 50 MG/G
1 PATCH TOPICAL EVERY 24 HOURS
Qty: 15 PATCH | Refills: 0 | Status: SHIPPED | OUTPATIENT
Start: 2024-02-14 | End: 2024-02-14

## 2024-02-14 RX ORDER — LIDOCAINE 50 MG/G
2 PATCH TOPICAL ONCE
Status: DISCONTINUED | OUTPATIENT
Start: 2024-02-14 | End: 2024-02-14 | Stop reason: HOSPADM

## 2024-02-14 RX ORDER — LIDOCAINE 50 MG/G
1 PATCH TOPICAL EVERY 24 HOURS
Qty: 15 PATCH | Refills: 0 | Status: SHIPPED | OUTPATIENT
Start: 2024-02-14

## 2024-02-14 RX ORDER — NAPROXEN 500 MG/1
500 TABLET ORAL 2 TIMES DAILY WITH MEALS
Qty: 30 TABLET | Refills: 0 | Status: SHIPPED | OUTPATIENT
Start: 2024-02-14

## 2024-02-14 RX ORDER — DIAZEPAM 5 MG/ML
5 INJECTION, SOLUTION INTRAMUSCULAR; INTRAVENOUS ONCE
Status: COMPLETED | OUTPATIENT
Start: 2024-02-14 | End: 2024-02-14

## 2024-02-14 RX ORDER — CYCLOBENZAPRINE HCL 10 MG
10 TABLET ORAL 2 TIMES DAILY PRN
Qty: 20 TABLET | Refills: 0 | Status: SHIPPED | OUTPATIENT
Start: 2024-02-14

## 2024-02-14 RX ADMIN — KETOROLAC TROMETHAMINE 30 MG: 30 INJECTION, SOLUTION INTRAMUSCULAR; INTRAVENOUS at 15:10

## 2024-02-14 RX ADMIN — DIAZEPAM 5 MG: 10 INJECTION, SOLUTION INTRAMUSCULAR; INTRAVENOUS at 15:10

## 2024-02-14 RX ADMIN — LIDOCAINE 2 PATCH: 50 PATCH CUTANEOUS at 15:11

## 2024-02-14 NOTE — TELEPHONE ENCOUNTER
Patient calls in stating she injured her back yesterday and the pain is severe and worsening. She is having difficulty sitting and standing , unsure if  numbness or tingling but left leg is worse .   Patient has taken 800 mg of motrin without relief.   Feels severe compression and pain when she tries to stand and walk.   No appointments available in office today.  I recommended ER evaluation for severe worsening pain and difficulty bearing weight. Patient agreed and will head to [facility].

## 2024-02-14 NOTE — TELEPHONE ENCOUNTER
"Reason for Disposition  • SEVERE back pain (e.g., excruciating, unable to do any normal activities) and not improved after pain medicine and CARE ADVICE  • Sounds like a serious injury to the triager    Answer Assessment - Initial Assessment Questions  1. MECHANISM: \"How did the injury happen?\" (Consider the possibility of domestic violence or elder abuse)        Lower back injury lifted a  yesterday      2. ONSET: \"When did the injury happen?\" (Minutes or hours ago)        yesterday    3. LOCATION: \"What part of the back is injured?\"        Lower back and left side    4. SEVERITY: \"Can you move the back normally?\"        severe    5. PAIN: \"Is there any pain?\" If Yes, ask: \"How bad is the pain?\"   (Scale 1-10; or mild, moderate, severe)        Severe pain left leg pain     6. CORD SYMPTOMS: Any weakness or numbness of the arms or legs?\"        Numbness and compression feeling left leg     7. SIZE: For cuts, bruises, or swelling, ask: \"How large is it?\" (e.g., inches or centimeters)          denies      9. OTHER SYMPTOMS: \"Do you have any other symptoms?\" (e.g., abdominal pain, blood in urine)       denies    Protocols used: Back Injury-ADULT-[place]    "

## 2024-02-14 NOTE — DISCHARGE INSTRUCTIONS
Please follow up with your primary care provider concerning your visit today.  Please return to the Emergency Department if you develop any numbness, tingling, difficulty walking, loss of bowel or bladder function, or for any other concerns.

## 2024-02-14 NOTE — ED PROVIDER NOTES
"History  Chief Complaint   Patient presents with    Back Pain     Patient was lifting a  with her  yesterday and over extended back. 800mg ibuprofen around 11am with no relief     (Winnie Lester) Winnie Lester is a 48 y.o. female     They presented to the emergency department on February 14, 2024. Patient presents with:  Back Pain: Patient was lifting a  with her  yesterday and over extended back. 800mg ibuprofen around 11am with no relief.    The patient states that yesterday she was helping her  lift their snowblower over some logs, and believes she over it flexed her back and felt a pop sensation.  Patient had pain that traveled down her left leg at that time, was able to lower herself to the ground and the pain improved.  Patient then laid inside on the floor, and states that she felt another pop sensation with significant improvement of her symptoms and was able to sleep comfortably throughout the night.  Patient states however today while sitting upright she began feeling \"muscle spasms\" which caused her to, to the emergency department for evaluation. Patient denies incontinence of bowel or bladder, paresthesias, fevers, chills, chest pain, abdominal pain, nausea, vomiting, or any other complaint at this time.              Prior to Admission Medications   Prescriptions Last Dose Informant Patient Reported? Taking?   atorvastatin (LIPITOR) 20 mg tablet  Self No No   Sig: Take 1 tablet (20 mg total) by mouth daily   estradiol (ESTRACE VAGINAL) 0.1 mg/g vaginal cream  Self No No   Sig: Insert 1 g into the vagina daily      Facility-Administered Medications: None       Past Medical History:   Diagnosis Date    High cholesterol        Past Surgical History:   Procedure Laterality Date    APPENDECTOMY  2013    COLONOSCOPY      HYSTERECTOMY  2013       Family History   Problem Relation Age of Onset    Cancer Father         bladder    Bone cancer Paternal Grandmother      I " have reviewed and agree with the history as documented.    E-Cigarette/Vaping    E-Cigarette Use Never User      E-Cigarette/Vaping Substances     Social History     Tobacco Use    Smoking status: Former     Current packs/day: 0.00     Average packs/day: 1 pack/day for 5.0 years (5.0 ttl pk-yrs)     Types: Cigarettes     Quit date: 1995     Years since quittin.1    Smokeless tobacco: Never    Tobacco comments:     Haven't smoked for over 30 years   Vaping Use    Vaping status: Never Used   Substance Use Topics    Alcohol use: Yes     Comment: 1-2 drinks monthly, Occasional social drinker    Drug use: Never        Review of Systems   Constitutional:  Negative for chills and fever.   HENT:  Negative for ear pain and sore throat.    Eyes:  Negative for pain and visual disturbance.   Respiratory:  Negative for cough and shortness of breath.    Cardiovascular:  Negative for chest pain and palpitations.   Gastrointestinal:  Negative for abdominal pain and vomiting.   Genitourinary:  Negative for dysuria and hematuria.   Musculoskeletal:  Positive for back pain. Negative for arthralgias.   Skin:  Negative for color change and rash.   Neurological:  Negative for seizures and syncope.   All other systems reviewed and are negative.      Physical Exam  ED Triage Vitals [24 1330]   Temperature Pulse Respirations Blood Pressure SpO2   98.3 °F (36.8 °C) 85 18 130/60 98 %      Temp Source Heart Rate Source Patient Position - Orthostatic VS BP Location FiO2 (%)   Oral Monitor Sitting Left arm --      Pain Score       8             Orthostatic Vital Signs  Vitals:    24 1330   BP: 130/60   Pulse: 85   Patient Position - Orthostatic VS: Sitting       Physical Exam  Vitals and nursing note reviewed.   Constitutional:       General: She is not in acute distress.     Appearance: Normal appearance.   HENT:      Head: Normocephalic and atraumatic.      Right Ear: External ear normal.      Left Ear: External ear  normal.      Nose: Nose normal.      Mouth/Throat:      Mouth: Mucous membranes are moist.   Eyes:      Conjunctiva/sclera: Conjunctivae normal.   Cardiovascular:      Rate and Rhythm: Normal rate and regular rhythm.   Pulmonary:      Effort: Pulmonary effort is normal. No respiratory distress.      Breath sounds: Normal breath sounds.   Abdominal:      General: Abdomen is flat. Bowel sounds are normal.      Tenderness: There is no abdominal tenderness. There is no guarding or rebound.   Musculoskeletal:         General: Tenderness (Palpable bilateral paralumbar tenderness with spasm, no midline tenderness, no vertebral point tenderness, no step-offs) present. Normal range of motion.      Cervical back: Normal range of motion.   Skin:     General: Skin is warm and dry.      Capillary Refill: Capillary refill takes less than 2 seconds.   Neurological:      Mental Status: She is alert. Mental status is at baseline.   Psychiatric:         Mood and Affect: Mood normal.         ED Medications  Medications   lidocaine (LIDODERM) 5 % patch 2 patch (2 patches Topical Medication Applied 2/14/24 1511)   diazepam (VALIUM) injection 5 mg (5 mg Intramuscular Given 2/14/24 1510)   ketorolac (TORADOL) injection 30 mg (30 mg Intramuscular Given 2/14/24 1510)       Diagnostic Studies  Results Reviewed       None                   XR spine lumbar 2 or 3 views injury   ED Interpretation by Keshav West MD (02/14 1537)   No acute fracture or dislocation. Interpreted independently by myself.            Procedures  Procedures      ED Course                             SBIRT 22yo+      Flowsheet Row Most Recent Value   Initial Alcohol Screen: US AUDIT-C     1. How often do you have a drink containing alcohol? 0 Filed at: 02/14/2024 1331   2. How many drinks containing alcohol do you have on a typical day you are drinking?  0 Filed at: 02/14/2024 1331   3a. Male UNDER 65: How often do you have five or more drinks on one occasion? 0  Filed at: 02/14/2024 1331   3b. FEMALE Any Age, or MALE 65+: How often do you have 4 or more drinks on one occassion? 0 Filed at: 02/14/2024 1331   Audit-C Score 0 Filed at: 02/14/2024 1331   ADRIÁN: How many times in the past year have you...    Used an illegal drug or used a prescription medication for non-medical reasons? Never Filed at: 02/14/2024 1331                  Medical Decision Making  48-year-old female with no significant pertinent past medical history presents to the emergency department with chief complaint of lower back pain after extending her back while lifting a snowblower.  Patient with palpable bilateral paralumbar muscle spasms without concerning signs or symptoms for traumatic injury.  Differential diagnosis includes but is not limited to muscle strain, sciatica, radiculopathy.  X-ray of the lumbar spine was obtained which showed no acute fracture or dislocation.  Patient was given Valium as well as Toradol in addition to Lidoderm patches with significant improvement of symptoms.  Patient was given Rx as below.  Information for comprehensive spine clinic given to patient.  Patient appears well, nontoxic, agrees with plan of care at this time.  Answered all questions.  In light of this, patient would benefit from outpatient follow-up..    Amount and/or Complexity of Data Reviewed  Radiology: ordered and independent interpretation performed.    Risk  Prescription drug management.          Disposition  Final diagnoses:   Low back pain   Strain of lumbar region, initial encounter     Time reflects when diagnosis was documented in both MDM as applicable and the Disposition within this note       Time User Action Codes Description Comment    2/14/2024  4:18 PM Keshav West Add [M54.50] Low back pain     2/14/2024  4:18 PM Keshav West Add [S39.012A] Strain of lumbar region, initial encounter           ED Disposition       ED Disposition   Discharge    Condition   Stable    Date/Time    Wed Feb 14, 2024 1617    Comment   Winnie Lester discharge to home/self care.                   Follow-up Information       Follow up With Specialties Details Why Contact Info Additional Information    Chaim Pierce DO Family Medicine   [address] [address]  [phone]       [facility]   [phone]            Patient's Medications   Discharge Prescriptions    CYCLOBENZAPRINE (FLEXERIL) 10 MG TABLET    Take 1 tablet (10 mg total) by mouth 2 (two) times a day as needed for muscle spasms       Start Date: 2/14/2024 End Date: --       Order Dose: 10 mg       Quantity: 20 tablet    Refills: 0    LIDOCAINE (LIDODERM) 5 %    Apply 1 patch topically over 12 hours every 24 hours Remove & Discard patch within 12 hours or as directed by MD       Start Date: 2/14/2024 End Date: --       Order Dose: 1 patch       Quantity: 15 patch    Refills: 0    NAPROXEN (NAPROSYN) 500 MG TABLET    Take 1 tablet (500 mg total) by mouth 2 (two) times a day with meals       Start Date: 2/14/2024 End Date: --       Order Dose: 500 mg       Quantity: 30 tablet    Refills: 0     No discharge procedures on file.    PDMP Review       None             ED Provider  Attending physically available and evaluated Winnie Lester. I managed the patient along with the ED Attending.    Electronically Signed by           Keshav West MD  02/14/24 9031

## 2024-02-14 NOTE — ED ATTENDING ATTESTATION
"2/14/2024  I, Winnie Marcum MD, saw and evaluated the patient. I have discussed the patient with the resident/non-physician practitioner and agree with the resident's/non-physician practitioner's findings, Plan of Care, and MDM as documented in the resident's/non-physician practitioner's note, except where noted. All available labs and Radiology studies were reviewed.  I was present for key portions of any procedure(s) performed by the resident/non-physician practitioner and I was immediately available to provide assistance.       At this point I agree with the current assessment done in the Emergency Department.  I have conducted an independent evaluation of this patient a history and physical is as follows:    48-year-old female presenting for evaluation of low back pain that started acutely yesterday when lifting a snowblower.  She reports feeling a \"popping sensation\" in her low back.  Initially had some pain that radiated down the back of the left leg that is no longer present.  Last night when rolling over in bed she felt \"my back popped back into place,\" since that time has been experiencing low back spasms.  Denies bowel or bladder incontinence, saddle anesthesia, or numbness or weakness in her legs.    Physical Exam  Constitutional:       General: She is not in acute distress.     Appearance: She is well-developed. She is not diaphoretic.   HENT:      Head: Normocephalic and atraumatic.      Right Ear: External ear normal.      Left Ear: External ear normal.      Nose: Nose normal.   Eyes:      Conjunctiva/sclera: Conjunctivae normal.   Cardiovascular:      Rate and Rhythm: Normal rate and regular rhythm.      Heart sounds: Normal heart sounds. No murmur heard.     No friction rub. No gallop.   Pulmonary:      Effort: Pulmonary effort is normal. No respiratory distress.      Breath sounds: Normal breath sounds. No wheezing or rales.   Abdominal:      General: Bowel sounds are normal. There is no " distension.      Palpations: Abdomen is soft.      Tenderness: There is no abdominal tenderness. There is no guarding.   Musculoskeletal:         General: No deformity. Normal range of motion.      Cervical back: Normal range of motion and neck supple.      Comments: No midline tenderness to palpation over the C or T-spine.  Tenderness to palpation over the lower lumbar spine and the bilateral paraspinous muscles with spasm like pain with certain movements.     Skin:     General: Skin is warm and dry.   Neurological:      Mental Status: She is alert and oriented to person, place, and time.      Motor: No abnormal muscle tone.      Comments: 5/5 strength in the proximal and distal bilateral lower extremities with intact sensation to light touch. No saddle anesthesia. 2+ patellar reflexes bilaterally. No ankle clonus.    Psychiatric:         Mood and Affect: Mood normal.           ED Course  ED Course as of 02/14/24 1540   Wed Feb 14, 2024   1535 X-ray of the lumbar spine with no acute fracture or malalignment.  Patient has no red flag symptoms for cauda equina, conus medullaris, or spinal cord mass lesion.  Negative straight leg raise.  Suspect muscle spasm.  Toradol and Valium as well as lidocaine patch given in the ED.  Will discharge with course of Flexeril and naproxen.  Return precautions discussed.  Ambulatory referral placed for the comprehensive spine program.         Critical Care Time  Procedures

## 2024-02-21 PROBLEM — Z01.419 WELL FEMALE EXAM WITH ROUTINE GYNECOLOGICAL EXAM: Status: RESOLVED | Noted: 2024-01-18 | Resolved: 2024-02-21

## 2024-02-28 ENCOUNTER — HOSPITAL ENCOUNTER (OUTPATIENT)
Facility: HOSPITAL | Age: 49
Discharge: HOME | End: 2024-02-28
Payer: COMMERCIAL

## 2024-02-28 VITALS — WEIGHT: 180 LBS | HEIGHT: 66 IN | BODY MASS INDEX: 28.93 KG/M2

## 2024-02-28 DIAGNOSIS — Z12.31 ENCOUNTER FOR SCREENING MAMMOGRAM FOR BREAST CANCER: ICD-10-CM

## 2024-02-28 PROCEDURE — 77067 SCR MAMMO BI INCL CAD: CPT

## 2024-03-07 ENCOUNTER — OFFICE VISIT (OUTPATIENT)
Dept: OBGYN CLINIC | Facility: CLINIC | Age: 49
End: 2024-03-07
Payer: COMMERCIAL

## 2024-03-07 VITALS
BODY MASS INDEX: 30.22 KG/M2 | DIASTOLIC BLOOD PRESSURE: 74 MMHG | HEIGHT: 66 IN | WEIGHT: 188 LBS | SYSTOLIC BLOOD PRESSURE: 128 MMHG

## 2024-03-07 DIAGNOSIS — Z01.419 ENCOUNTER FOR WELL WOMAN EXAM: Primary | ICD-10-CM

## 2024-03-07 DIAGNOSIS — Z90.710 H/O TOTAL HYSTERECTOMY: ICD-10-CM

## 2024-03-07 DIAGNOSIS — Z12.39 ENCOUNTER FOR SCREENING BREAST EXAMINATION: ICD-10-CM

## 2024-03-07 PROCEDURE — 99386 PREV VISIT NEW AGE 40-64: CPT | Performed by: PHYSICIAN ASSISTANT

## 2024-03-27 NOTE — PROGRESS NOTES
"Assessment/Plan:    No problem-specific Assessment & Plan notes found for this encounter.       Diagnoses and all orders for this visit:    Encounter for well woman exam    Encounter for screening breast examination    H/O total hysterectomy          Subjective:      Patient ID: Winnie Lester is a 48 y.o. female.    Pt presents for her annual exam today--  Moved here not long ago from [place]  She has no complaints  She has no bleeding or pelvic pain--s/p hyster-bso  Bowel and bladder are regular  Colonoscopy--utd  No breast concerns today  Last mammo--last week    No pap today.    Rx mammo  Daily ca, d          The following portions of the patient's history were reviewed and updated as appropriate: allergies, current medications, past family history, past medical history, past social history, past surgical history, and problem list.    Review of Systems   Constitutional:  Negative for chills, fever and unexpected weight change.   HENT:  Negative for ear pain and sore throat.    Eyes:  Negative for pain and visual disturbance.   Respiratory:  Negative for cough and shortness of breath.    Cardiovascular:  Negative for chest pain and palpitations.   Gastrointestinal:  Negative for abdominal pain, blood in stool, constipation, diarrhea and vomiting.   Genitourinary: Negative.  Negative for dysuria and hematuria.   Musculoskeletal:  Negative for arthralgias and back pain.   Skin:  Negative for color change and rash.   Neurological:  Negative for seizures and syncope.   All other systems reviewed and are negative.        Objective:      /74 (BP Location: Right arm, Patient Position: Sitting, Cuff Size: Standard)   Ht 5' 6\" (1.676 m)   Wt 85.3 kg (188 lb)   BMI 30.34 kg/m²          Physical Exam  Vitals and nursing note reviewed.   Constitutional:       Appearance: Normal appearance. She is well-developed.   HENT:      Head: Normocephalic and atraumatic.   Chest:   Breasts:     Right: No inverted nipple, mass, " nipple discharge or skin change.      Left: No inverted nipple, mass, nipple discharge or skin change.   Abdominal:      Palpations: Abdomen is soft.   Genitourinary:     General: Normal vulva.      Exam position: Supine.      Labia:         Right: No rash, tenderness or lesion.         Left: No rash, tenderness or lesion.       Vagina: Normal.      Cervix: No cervical motion tenderness, discharge or friability.      Uterus: Absent.       Adnexa:         Right: No mass, tenderness or fullness.          Left: No mass, tenderness or fullness.     Musculoskeletal:      Cervical back: Normal range of motion.   Lymphadenopathy:      Lower Body: No right inguinal adenopathy. No left inguinal adenopathy.   Neurological:      Mental Status: She is alert.

## 2024-04-01 ENCOUNTER — ANESTHESIA (OUTPATIENT)
Dept: ANESTHESIOLOGY | Facility: HOSPITAL | Age: 49
End: 2024-04-01

## 2024-04-01 ENCOUNTER — ANESTHESIA EVENT (OUTPATIENT)
Dept: ANESTHESIOLOGY | Facility: HOSPITAL | Age: 49
End: 2024-04-01

## 2024-04-09 ENCOUNTER — TELEPHONE (OUTPATIENT)
Dept: GASTROENTEROLOGY | Facility: AMBULARY SURGERY CENTER | Age: 49
End: 2024-04-09

## 2024-04-14 RX ORDER — SODIUM CHLORIDE, SODIUM LACTATE, POTASSIUM CHLORIDE, CALCIUM CHLORIDE 600; 310; 30; 20 MG/100ML; MG/100ML; MG/100ML; MG/100ML
75 INJECTION, SOLUTION INTRAVENOUS CONTINUOUS
Status: CANCELLED | OUTPATIENT
Start: 2024-04-14

## 2024-04-15 ENCOUNTER — ANESTHESIA EVENT (OUTPATIENT)
Dept: GASTROENTEROLOGY | Facility: AMBULARY SURGERY CENTER | Age: 49
End: 2024-04-15

## 2024-04-15 ENCOUNTER — HOSPITAL ENCOUNTER (OUTPATIENT)
Dept: GASTROENTEROLOGY | Facility: AMBULARY SURGERY CENTER | Age: 49
Setting detail: OUTPATIENT SURGERY
Discharge: HOME | End: 2024-04-15
Attending: INTERNAL MEDICINE
Payer: COMMERCIAL

## 2024-04-15 ENCOUNTER — ANESTHESIA (OUTPATIENT)
Dept: GASTROENTEROLOGY | Facility: AMBULARY SURGERY CENTER | Age: 49
End: 2024-04-15

## 2024-04-15 VITALS
OXYGEN SATURATION: 97 % | TEMPERATURE: 97.9 F | HEART RATE: 78 BPM | DIASTOLIC BLOOD PRESSURE: 74 MMHG | SYSTOLIC BLOOD PRESSURE: 113 MMHG | RESPIRATION RATE: 18 BRPM

## 2024-04-15 DIAGNOSIS — Z86.010 HISTORY OF COLON POLYPS: ICD-10-CM

## 2024-04-15 PROCEDURE — 88305 TISSUE EXAM BY PATHOLOGIST: CPT | Performed by: PATHOLOGY

## 2024-04-15 RX ORDER — LIDOCAINE HYDROCHLORIDE 10 MG/ML
INJECTION, SOLUTION EPIDURAL; INFILTRATION; INTRACAUDAL; PERINEURAL AS NEEDED
Status: DISCONTINUED | OUTPATIENT
Start: 2024-04-15 | End: 2024-04-15 | Stop reason: HOSPADM

## 2024-04-15 RX ORDER — PROPOFOL 10 MG/ML
INJECTION, EMULSION INTRAVENOUS AS NEEDED
Status: DISCONTINUED | OUTPATIENT
Start: 2024-04-15 | End: 2024-04-15 | Stop reason: HOSPADM

## 2024-04-15 RX ORDER — SODIUM CHLORIDE, SODIUM LACTATE, POTASSIUM CHLORIDE, CALCIUM CHLORIDE 600; 310; 30; 20 MG/100ML; MG/100ML; MG/100ML; MG/100ML
75 INJECTION, SOLUTION INTRAVENOUS CONTINUOUS
Status: DISCONTINUED | OUTPATIENT
Start: 2024-04-15 | End: 2024-04-19 | Stop reason: HOSPADM

## 2024-04-15 RX ORDER — SODIUM CHLORIDE, SODIUM LACTATE, POTASSIUM CHLORIDE, CALCIUM CHLORIDE 600; 310; 30; 20 MG/100ML; MG/100ML; MG/100ML; MG/100ML
INJECTION, SOLUTION INTRAVENOUS CONTINUOUS PRN
Status: DISCONTINUED | OUTPATIENT
Start: 2024-04-15 | End: 2024-04-15 | Stop reason: HOSPADM

## 2024-04-15 RX ADMIN — PROPOFOL 50 MG: 10 INJECTION, EMULSION INTRAVENOUS at 08:25

## 2024-04-15 RX ADMIN — PROPOFOL 100 MG: 10 INJECTION, EMULSION INTRAVENOUS at 08:14

## 2024-04-15 RX ADMIN — SODIUM CHLORIDE, SODIUM LACTATE, POTASSIUM CHLORIDE, AND CALCIUM CHLORIDE: .6; .31; .03; .02 INJECTION, SOLUTION INTRAVENOUS at 07:41

## 2024-04-15 RX ADMIN — SODIUM CHLORIDE, SODIUM LACTATE, POTASSIUM CHLORIDE, AND CALCIUM CHLORIDE 75 ML/HR: .6; .31; .03; .02 INJECTION, SOLUTION INTRAVENOUS at 07:34

## 2024-04-15 RX ADMIN — PROPOFOL 150 MG: 10 INJECTION, EMULSION INTRAVENOUS at 08:11

## 2024-04-15 RX ADMIN — LIDOCAINE HYDROCHLORIDE 50 MG: 10 INJECTION, SOLUTION EPIDURAL; INFILTRATION; INTRACAUDAL; PERINEURAL at 08:11

## 2024-04-15 RX ADMIN — PROPOFOL 50 MG: 10 INJECTION, EMULSION INTRAVENOUS at 08:21

## 2024-04-15 NOTE — ANESTHESIA PREPROCEDURE EVALUATION
Procedure:  COLONOSCOPY    Relevant Problems   CARDIO   (+) Mixed hyperlipidemia      GYN   (+) History of hysterectomy      MUSCULOSKELETAL  Cervical hardware        Physical Exam    Airway    Mallampati score: II  TM Distance: >3 FB  Neck ROM: full     Dental       Cardiovascular  Rhythm: regular, Rate: normal    Pulmonary   Breath sounds clear to auscultation    Other Findings  post-pubertal.      Anesthesia Plan  ASA Score- 2     Anesthesia Type- IV sedation with anesthesia with ASA Monitors.         Additional Monitors:     Airway Plan:            Plan Factors-    Chart reviewed.        Patient is not a current smoker.              Induction- intravenous.    Postoperative Plan-     Informed Consent- Anesthetic plan and risks discussed with patient.  I personally reviewed this patient with the CRNA. Discussed and agreed on the Anesthesia Plan with the CRNA..

## 2024-04-15 NOTE — H&P
History and Physical - [facility]  Winnie Lester 48 y.o. female MRN: [id number]                  HPI: Winnie Lester is a 48 y.o. year old female who presents for history of colon polyps.      REVIEW OF SYSTEMS: Per the HPI, and otherwise unremarkable.    Historical Information   Past Medical History:   Diagnosis Date    Endometriosis     Fibroid     High cholesterol     Hyperthyroidism     Hypothyroidism     Migraine     Varicella      Past Surgical History:   Procedure Laterality Date    APPENDECTOMY      COLONOSCOPY      HYSTERECTOMY      OOPHORECTOMY       Social History   Social History     Substance and Sexual Activity   Alcohol Use Yes    Comment: 1-2 drinks monthly, Occasional social drinker     Social History     Substance and Sexual Activity   Drug Use Never     Social History     Tobacco Use   Smoking Status Former    Current packs/day: 0.00    Average packs/day: 1 pack/day for 5.0 years (5.0 ttl pk-yrs)    Types: Cigarettes    Quit date: 1995    Years since quittin.3   Smokeless Tobacco Never   Tobacco Comments    Haven't smoked for over 30 years     Family History   Problem Relation Age of Onset    Cancer Father         Bladder cancer    No Known Problems Sister     Pancreatic cancer Maternal Grandmother     Cancer Maternal Grandmother         Pancreas Cancer    Bone cancer Paternal Grandmother     Lung cancer Paternal Grandmother     Cancer Paternal Grandmother         Lung cancer    No Known Problems Paternal Aunt     No Known Problems Paternal Aunt        Meds/Allergies       Current Outpatient Medications:     atorvastatin (LIPITOR) 20 mg tablet    naproxen (Naprosyn) 500 mg tablet    Current Facility-Administered Medications:     lactated ringers infusion, 75 mL/hr, Intravenous, Continuous, 75 mL/hr at 04/15/24 0734    Facility-Administered Medications Ordered in Other Encounters:     lactated ringers infusion, , Intravenous, Continuous PRN, New Bag at 04/15/24  0741    Allergies   Allergen Reactions    Hydrocodone Nausea Only    Medical Tape Rash     Tagederm - rashes    Nickel Rash       Objective     /74   Pulse 73   Temp 97.9 °F (36.6 °C) (Temporal)   Resp 18   SpO2 98%       PHYSICAL EXAM    Gen: NAD  Head: NCAT  CV: RRR  CHEST: Clear  ABD: soft, NT/ND  EXT: no edema      ASSESSMENT/PLAN:  This is a 48 y.o. year old female here for colonoscopy, and she is stable and optimized for her procedure.

## 2024-04-15 NOTE — ANESTHESIA POSTPROCEDURE EVALUATION
Post-Op Assessment Note    CV Status:  Stable  Pain Score: 0    Pain management: adequate       Mental Status:  Alert and awake   Hydration Status:  Euvolemic   PONV Controlled:  Controlled   Airway Patency:  Patent     Post Op Vitals Reviewed: Yes    No anethesia notable event occurred.    Staff: CRNA               BP   132/78   Temp   97.2   Pulse  82   Resp   22   SpO2   98

## 2024-04-17 PROCEDURE — 88305 TISSUE EXAM BY PATHOLOGIST: CPT | Performed by: PATHOLOGY

## 2024-05-13 DIAGNOSIS — S39.012A STRAIN OF LUMBAR REGION, INITIAL ENCOUNTER: ICD-10-CM

## 2024-05-15 RX ORDER — NAPROXEN 500 MG/1
500 TABLET ORAL 2 TIMES DAILY WITH MEALS
Qty: 60 TABLET | Refills: 3 | Status: SHIPPED | OUTPATIENT
Start: 2024-05-15

## 2024-05-24 DIAGNOSIS — B00.1 RECURRENT COLD SORES: Primary | ICD-10-CM

## 2024-05-24 RX ORDER — VALACYCLOVIR HYDROCHLORIDE 1 G/1
TABLET, FILM COATED ORAL
Qty: 60 TABLET | Refills: 1 | Status: SHIPPED | OUTPATIENT
Start: 2024-05-24 | End: 2025-05-23

## 2024-07-30 LAB
ALBUMIN SERPL-MCNC: 4.7 G/DL (ref 3.6–5.1)
ALBUMIN/GLOB SERPL: 1.8 (CALC) (ref 1–2.5)
ALP SERPL-CCNC: 68 U/L (ref 37–153)
ALT SERPL-CCNC: 28 U/L (ref 6–29)
AST SERPL-CCNC: 23 U/L (ref 10–35)
BILIRUB SERPL-MCNC: 0.4 MG/DL (ref 0.2–1.2)
BUN SERPL-MCNC: 19 MG/DL (ref 7–25)
BUN/CREAT SERPL: ABNORMAL (CALC) (ref 6–22)
CALCIUM SERPL-MCNC: 9.8 MG/DL (ref 8.6–10.4)
CHLORIDE SERPL-SCNC: 105 MMOL/L (ref 98–110)
CHOLEST SERPL-MCNC: 173 MG/DL
CHOLEST/HDLC SERPL: 3.2 (CALC)
CO2 SERPL-SCNC: 28 MMOL/L (ref 20–32)
CREAT SERPL-MCNC: 0.85 MG/DL (ref 0.5–1.03)
GFR/BSA.PRED SERPLBLD CYS-BASED-ARV: 83 ML/MIN/1.73M2
GLOBULIN SER CALC-MCNC: 2.6 G/DL (CALC) (ref 1.9–3.7)
GLUCOSE SERPL-MCNC: 104 MG/DL (ref 65–99)
HDLC SERPL-MCNC: 54 MG/DL
LDLC SERPL CALC-MCNC: 93 MG/DL (CALC)
NONHDLC SERPL-MCNC: 119 MG/DL (CALC)
POTASSIUM SERPL-SCNC: 5.2 MMOL/L (ref 3.5–5.3)
PROT SERPL-MCNC: 7.3 G/DL (ref 6.1–8.1)
SODIUM SERPL-SCNC: 141 MMOL/L (ref 135–146)
TRIGL SERPL-MCNC: 162 MG/DL

## 2024-08-01 ENCOUNTER — RA CDI HCC (OUTPATIENT)
Dept: OTHER | Facility: HOSPITAL | Age: 49
End: 2024-08-01

## 2024-08-07 ENCOUNTER — OFFICE VISIT (OUTPATIENT)
Dept: FAMILY MEDICINE CLINIC | Facility: CLINIC | Age: 49
End: 2024-08-07
Payer: COMMERCIAL

## 2024-08-07 VITALS
BODY MASS INDEX: 29.41 KG/M2 | WEIGHT: 183 LBS | HEIGHT: 66 IN | HEART RATE: 84 BPM | DIASTOLIC BLOOD PRESSURE: 74 MMHG | OXYGEN SATURATION: 97 % | SYSTOLIC BLOOD PRESSURE: 124 MMHG | RESPIRATION RATE: 16 BRPM

## 2024-08-07 DIAGNOSIS — Z23 NEED FOR SHINGLES VACCINE: ICD-10-CM

## 2024-08-07 DIAGNOSIS — E89.40 SURGICAL MENOPAUSE: ICD-10-CM

## 2024-08-07 DIAGNOSIS — Z90.710 HISTORY OF HYSTERECTOMY: ICD-10-CM

## 2024-08-07 DIAGNOSIS — E04.1 NODULE OF RIGHT LOBE OF THYROID GLAND: Primary | ICD-10-CM

## 2024-08-07 DIAGNOSIS — R73.01 IMPAIRED FASTING GLUCOSE: ICD-10-CM

## 2024-08-07 DIAGNOSIS — E78.2 MIXED HYPERLIPIDEMIA: ICD-10-CM

## 2024-08-07 PROCEDURE — 90750 HZV VACC RECOMBINANT IM: CPT | Performed by: FAMILY MEDICINE

## 2024-08-07 PROCEDURE — 99214 OFFICE O/P EST MOD 30 MIN: CPT | Performed by: FAMILY MEDICINE

## 2024-08-07 PROCEDURE — 90471 IMMUNIZATION ADMIN: CPT | Performed by: FAMILY MEDICINE

## 2024-08-07 NOTE — PROGRESS NOTES
Subjective:      Patient ID: Winnie Lester is a 48 y.o. female.    48-year-old female with past medical history of hypothyroidism, hyperlipidemia, frequent migraines, postsurgical menopause at an early age presents to the office for 6-month follow-up along with her .  They have been doing extremely well and have been doing everything that they have been instructed to do.  She did establish with gynecology and also had colonoscopy performed as well.  Feel as though things are coming into place since moving from [place].  She states that while she was in [place] she was going to a naturopathic physician who really was not that naturopathic because they had her on testosterone pellets as well as GLP-1 agonist to help with some of her postsurgical menopause symptoms.  She would be interested in seeing an endocrinologist in this area.  States that when she was on testosterone pellets that her libido and creased and she felt more energetic however she was experiencing hair loss.  I have none of those records.  She did have labs performed which have been reviewed with her which showed normal CMP with the exception of fasting glucose of 104, total cholesterol 173, HDL 54, triglycerides 162, LDL 93        Past Medical History:   Diagnosis Date   • Endometriosis    • Fibroid    • High cholesterol    • Hyperthyroidism    • Hypothyroidism    • Migraine    • Varicella        Family History   Problem Relation Age of Onset   • Cancer Father         Bladder cancer   • No Known Problems Sister    • Pancreatic cancer Maternal Grandmother    • Cancer Maternal Grandmother         Pancreas Cancer   • Bone cancer Paternal Grandmother    • Lung cancer Paternal Grandmother    • Cancer Paternal Grandmother         Lung cancer   • No Known Problems Paternal Aunt    • No Known Problems Paternal Aunt        Past Surgical History:   Procedure Laterality Date   • APPENDECTOMY  2013   • COLONOSCOPY     • HYSTERECTOMY  2013   •  "OOPHORECTOMY          reports that she quit smoking about 29 years ago. Her smoking use included cigarettes. She has a 5 pack-year smoking history. She has never used smokeless tobacco. She reports current alcohol use. She reports that she does not use drugs.      Current Outpatient Medications:   •  atorvastatin (LIPITOR) 20 mg tablet, Take 1 tablet (20 mg total) by mouth daily, Disp: 90 tablet, Rfl: 3  •  naproxen (Naprosyn) 500 mg tablet, Take 1 tablet (500 mg total) by mouth 2 (two) times a day with meals, Disp: 60 tablet, Rfl: 3  •  valACYclovir (VALTREX) 1,000 mg tablet, Take 1 tablet twice daily for 1 day at the first sign of infection, Disp: 60 tablet, Rfl: 1    The following portions of the patient's history were reviewed and updated as appropriate: allergies, current medications, past family history, past medical history, past social history, past surgical history and problem list.    Review of Systems   Constitutional:  Positive for unexpected weight change (Weight gain).   HENT: Negative.     Eyes: Negative.    Respiratory: Negative.     Cardiovascular: Negative.    Gastrointestinal: Negative.    Endocrine: Negative.         Decreased libido   Genitourinary: Negative.    Musculoskeletal: Negative.    Skin: Negative.    Allergic/Immunologic: Negative.    Neurological: Negative.    Hematological: Negative.    Psychiatric/Behavioral: Negative.             Objective:    /74   Pulse 84   Resp 16   Ht 5' 6\" (1.676 m)   Wt 83 kg (183 lb)   SpO2 97%   BMI 29.54 kg/m²      Physical Exam  Vitals and nursing note reviewed.   Constitutional:       General: She is not in acute distress.     Appearance: Normal appearance. She is well-developed and normal weight. She is not diaphoretic.   HENT:      Head: Normocephalic and atraumatic.      Right Ear: Tympanic membrane, ear canal and external ear normal.      Left Ear: Tympanic membrane, ear canal and external ear normal.      Mouth/Throat:      Mouth: " Mucous membranes are moist.      Pharynx: Oropharynx is clear.   Eyes:      Extraocular Movements: Extraocular movements intact.      Conjunctiva/sclera: Conjunctivae normal.      Pupils: Pupils are equal, round, and reactive to light.   Cardiovascular:      Rate and Rhythm: Normal rate and regular rhythm.      Pulses: Normal pulses.      Heart sounds: Normal heart sounds. No murmur heard.  Pulmonary:      Effort: Pulmonary effort is normal.      Breath sounds: Normal breath sounds.   Abdominal:      General: Abdomen is flat. Bowel sounds are normal.      Palpations: Abdomen is soft.   Musculoskeletal:         General: Normal range of motion.      Cervical back: Normal range of motion and neck supple.   Skin:     General: Skin is warm and dry.      Findings: No rash.   Neurological:      General: No focal deficit present.      Mental Status: She is alert and oriented to person, place, and time. Mental status is at baseline.      Deep Tendon Reflexes: Reflexes are normal and symmetric.   Psychiatric:         Mood and Affect: Mood normal.         Behavior: Behavior normal.         Thought Content: Thought content normal.         Judgment: Judgment normal.           Recent Results (from the past 1008 hour(s))   Lipid panel    Collection Time: 07/30/24  8:17 AM   Result Value Ref Range    Total Cholesterol 173 <200 mg/dL    HDL 54 > OR = 50 mg/dL    Triglycerides 162 (H) <150 mg/dL    LDL Calculated 93 mg/dL (calc)    Chol HDLC Ratio 3.2 <5.0 (calc)    Non-HDL Cholesterol 119 <130 mg/dL (calc)   Comprehensive metabolic panel    Collection Time: 07/30/24  8:17 AM   Result Value Ref Range    Glucose, Random 104 (H) 65 - 99 mg/dL    BUN 19 7 - 25 mg/dL    Creatinine 0.85 0.50 - 1.03 mg/dL    eGFR 83 > OR = 60 mL/min/1.73m2    SL AMB BUN/CREATININE RATIO SEE NOTE: 6 - 22 (calc)    Sodium 141 135 - 146 mmol/L    Potassium 5.2 3.5 - 5.3 mmol/L    Chloride 105 98 - 110 mmol/L    CO2 28 20 - 32 mmol/L    Calcium 9.8 8.6 - 10.4  mg/dL    Protein, Total 7.3 6.1 - 8.1 g/dL    Albumin 4.7 3.6 - 5.1 g/dL    Globulin 2.6 1.9 - 3.7 g/dL (calc)    Albumin/Globulin Ratio 1.8 1.0 - 2.5 (calc)    TOTAL BILIRUBIN 0.4 0.2 - 1.2 mg/dL    Alkaline Phosphatase 68 37 - 153 U/L    AST 23 10 - 35 U/L    ALT 28 6 - 29 U/L       Assessment/Plan:    Nodule of right lobe of thyroid gland  Thyroid ultrasound shows 2 small thyroid nodules none of which meet criteria for biopsy.  In comparing to prior thyroid ultrasound there has been little change.  Repeat thyroid ultrasound will be done in 1 year    Impaired fasting glucose  Mildly impaired fasting glucose of 104.  Watch dietary intake of carbohydrates.  She is trying to avoid refined sugar as it seems to cause her gastrointestinal upset.  A1c will be done in 6 months    Surgical menopause  Patient had total hysterectomy years ago.  She would like to see endocrinologist to discuss possible hormonal treatment.  Not certain that hormone replacement therapy is advisable anymore.  That is a case-by-case basis.  Referral to [facility] endocrinology to discuss    Mixed hyperlipidemia  Cholesterol remains adequately controlled on atorvastatin 20 mg once daily.  She does not need refills.  Repeat lipid panel will be done in 6 months          Problem List Items Addressed This Visit        Endocrine    Impaired fasting glucose     Mildly impaired fasting glucose of 104.  Watch dietary intake of carbohydrates.  She is trying to avoid refined sugar as it seems to cause her gastrointestinal upset.  A1c will be done in 6 months         Relevant Orders    Hemoglobin A1C    Nodule of right lobe of thyroid gland - Primary     Thyroid ultrasound shows 2 small thyroid nodules none of which meet criteria for biopsy.  In comparing to prior thyroid ultrasound there has been little change.  Repeat thyroid ultrasound will be done in 1 year         Relevant Orders    CBC and differential    TSH, 3rd generation with Free T4 reflex        Surgery/Wound/Pain    History of hysterectomy    Relevant Orders    Ambulatory Referral to Endocrinology    Surgical menopause     Patient had total hysterectomy years ago.  She would like to see endocrinologist to discuss possible hormonal treatment.  Not certain that hormone replacement therapy is advisable anymore.  That is a case-by-case basis.  Referral to [facility] endocrinology to discuss         Relevant Orders    Ambulatory Referral to Endocrinology       Other    Mixed hyperlipidemia     Cholesterol remains adequately controlled on atorvastatin 20 mg once daily.  She does not need refills.  Repeat lipid panel will be done in 6 months         Relevant Orders    Comprehensive metabolic panel    Lipid panel   Other Visit Diagnoses     Need for shingles vaccine        Relevant Orders    Zoster Vaccine Recombinant IM (Completed)

## 2024-08-07 NOTE — ASSESSMENT & PLAN NOTE
Patient had total hysterectomy years ago.  She would like to see endocrinologist to discuss possible hormonal treatment.  Not certain that hormone replacement therapy is advisable anymore.  That is a case-by-case basis.  Referral to [facility]'s endocrinology to discuss

## 2024-08-07 NOTE — ASSESSMENT & PLAN NOTE
Cholesterol remains adequately controlled on atorvastatin 20 mg once daily.  She does not need refills.  Repeat lipid panel will be done in 6 months

## 2024-10-02 ENCOUNTER — OFFICE VISIT (OUTPATIENT)
Dept: ENDOCRINOLOGY | Facility: CLINIC | Age: 49
End: 2024-10-02
Payer: COMMERCIAL

## 2024-10-02 VITALS
HEIGHT: 66 IN | HEART RATE: 90 BPM | OXYGEN SATURATION: 94 % | BODY MASS INDEX: 31.18 KG/M2 | DIASTOLIC BLOOD PRESSURE: 80 MMHG | SYSTOLIC BLOOD PRESSURE: 120 MMHG | WEIGHT: 194 LBS

## 2024-10-02 DIAGNOSIS — R53.83 OTHER FATIGUE: ICD-10-CM

## 2024-10-02 DIAGNOSIS — E55.9 VITAMIN D DEFICIENCY: ICD-10-CM

## 2024-10-02 DIAGNOSIS — Z78.0 POSTMENOPAUSAL: ICD-10-CM

## 2024-10-02 DIAGNOSIS — Z86.39 H/O: HYPOTHYROIDISM: ICD-10-CM

## 2024-10-02 DIAGNOSIS — E04.2 MULTIPLE THYROID NODULES: Primary | ICD-10-CM

## 2024-10-02 DIAGNOSIS — R63.5 WEIGHT GAIN: ICD-10-CM

## 2024-10-02 PROCEDURE — 99204 OFFICE O/P NEW MOD 45 MIN: CPT | Performed by: INTERNAL MEDICINE

## 2024-10-02 NOTE — PROGRESS NOTES
" Winnie Lester 49 y.o. female MRN: [id number]    Encounter: [id number]      Assessment & Plan     Thyroid nodule   History of hypothyroidism  Fatigue  Weight gain, obesity, current BMI 31  History of vitamin D deficiency  Postmenopausal  -will check TSH, T4  In addition to CBC, CMP, lipid panel, A1c that have already been ordered by PCP, will also check vitamin D levels  -Repeat ultrasound to assess interval change in thyroid nodules    -Recommend following up with GYN regarding postmenopausal symptoms, discuss HRT  -Continue healthy lifestyle through diet, exercise    Follow up in 3 months    CC:  thyroid nodule     History of Present Illness     HPI:  Winnie Lester is a 49 y.o. female who is here for a new consult for concern for thyroid disease.     H/o hysterectomy in 2012 for endometriosis; the year later had to have both ovaries removed for an ovarian cyst; Took estradiol for 1 year   Was also taking levothyroxine. States that there was a lot going on, discontinued both approx 10-11 years ago    C/o difficulty in being intimate. Experienced vaginal dryness and dyspareunia; low libido   [facility]\" and was recommended Bio-T  Feb 2023 - started testosterone pellets which she used for 6 months,. Noticed side effects - hair loss etc and so discontinued it.   Established care with Dr Pierce after moving from [place], [place] last September Tried vaginal estradiol which she did not do well with   C/o fatigue  Appetite is normal; C/o weight gain; was using ozempic/Wegovy  through the same clinic for weight loss from Feb to Aug 2024. Was in the form of a vial and syringe, not sure of dose.  Lost 15 lbs. 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   Occasional constipation/ diarrhea, uses benefiber in the morning   No heat or cold intolerance   No further hair loss.   No mood changes  Sleeps well     FH: paternal aunt with hypothyroidism   No history of thyroid carcinoma   Mother with rheumatoid arthritis     H/o thyroidnodule     All other systems were reviewed and are negative.       Review of Systems    Historical Information   Past Medical History:   Diagnosis Date    Endometriosis     Fibroid     High cholesterol     Hyperthyroidism     Hypothyroidism     Migraine     Varicella      Past Surgical History:   Procedure Laterality Date    APPENDECTOMY      COLONOSCOPY      HYSTERECTOMY      OOPHORECTOMY       Social History   Social History     Substance and Sexual Activity   Alcohol Use Yes    Comment: 1-2 drinks monthly, Occasional social drinker     Social History     Substance and Sexual Activity   Drug Use Never     Social History     Tobacco Use   Smoking Status Former    Current packs/day: 0.00    Average packs/day: 1 pack/day for 5.0 years (5.0 ttl pk-yrs)    Types: Cigarettes    Quit date: 1995    Years since quittin.7   Smokeless Tobacco Never   Tobacco Comments    Haven't smoked for over 30 years     Family History:   Family History   Problem Relation Age of Onset    Heart failure Mother     Parkinsonism Father     Cancer Father         Bladder cancer    Parkinsonism Sister     No Known Problems Maternal Aunt     No Known Problems Maternal Aunt     No Known Problems Maternal Aunt     No Known Problems Maternal Aunt     Parkinsonism Paternal Aunt     Parkinsonism Paternal Aunt     Parkinsonism Paternal Uncle     Diabetes type II Paternal Uncle     Pancreatic cancer Maternal Grandmother     Cancer Maternal Grandmother         Pancreas Cancer    Bone cancer Paternal Grandmother     Lung cancer Paternal Grandmother     Cancer Paternal Grandmother         Lung cancer       Meds/Allergies   Current Outpatient Medications  "  Medication Sig Dispense Refill    valACYclovir (VALTREX) 1,000 mg tablet Take 1 tablet twice daily for 1 day at the first sign of infection 60 tablet 1    atorvastatin (LIPITOR) 20 mg tablet Take 1 tablet (20 mg total) by mouth daily 90 tablet 3    naproxen (Naprosyn) 500 mg tablet Take 1 tablet (500 mg total) by mouth 2 (two) times a day with meals 60 tablet 3     No current facility-administered medications for this visit.     Allergies   Allergen Reactions    Hydrocodone Nausea Only    Medical Tape Rash     Tagederm - rashes    Nickel Rash       Objective   Vitals: Blood pressure 120/80, pulse 90, height 5' 6\" (1.676 m), weight 88 kg (194 lb), SpO2 94%.    Physical Exam  Constitutional:       Appearance: She is well-developed.   HENT:      Head: Normocephalic and atraumatic.   Eyes:      Conjunctiva/sclera: Conjunctivae normal.   Neck:      Thyroid: No thyromegaly.      Comments: Mildly enlarged, nodular goiter, nontender, nodules not fixed to the overlying skin or surrounding structures  Cardiovascular:      Rate and Rhythm: Normal rate and regular rhythm.      Heart sounds: Normal heart sounds. No murmur heard.  Pulmonary:      Effort: Pulmonary effort is normal.      Breath sounds: Normal breath sounds. No wheezing.   Abdominal:      General: There is no distension.      Palpations: Abdomen is soft.      Tenderness: There is no abdominal tenderness.      Comments: Skin colored striae    Musculoskeletal:         General: Normal range of motion.      Cervical back: Normal range of motion and neck supple.   Skin:     General: Skin is warm and dry.   Neurological:      Mental Status: She is alert and oriented to person, place, and time.      Deep Tendon Reflexes: Reflexes normal.      Comments: No tremors on the outstretched arms      Psychiatric:         Behavior: Behavior normal.         The history was obtained from the review of the chart, patient.    Lab Results:   Lab Results   Component Value Date/Time    " "TSH W/RFX TO FREE T4 2.29 11/30/2023 08:49 AM     Lab Results   Component Value Date    WBC 5.9 11/30/2023    HGB 14.1 11/30/2023    HCT 42.6 11/30/2023    MCV 85.2 11/30/2023     11/30/2023     Lab Results   Component Value Date    CREATININE 0.85 07/30/2024    BUN 19 07/30/2024    K 5.2 07/30/2024     07/30/2024    CO2 28 07/30/2024     No results found for: \"HGBA1C\"      Imaging Studies:   Results for orders placed during the hospital encounter of 11/20/23    US thyroid    Impression  No nodule meets current ACR criteria for requiring biopsy but followup ultrasound is recommended in 1 year.        Reference: ACR Thyroid Imaging, Reporting and Data System (TI-RADS): White Paper of the ACR TI-RADS Committee. J AM Paula Radiol 2017;14:587-595. (additional recommendations based on American Thyroid Association 2015 guidelines.)      Workstation performed: [id number]    Reviewed imaging study-thyroid ultrasound 11/2023     Portions of the record may have been created with voice recognition software. Occasional wrong word or \"sound a like\" substitutions may have occurred due to the inherent limitations of voice recognition software. Read the chart carefully and recognize, using context, where substitutions have occurred.    "

## 2024-10-02 NOTE — PATIENT INSTRUCTIONS
Please have labs done as ordered   You are being referred for an ultrasound of the thyroid   Follow up in 3 months  Follow up with Gyn

## 2024-10-29 ENCOUNTER — PATIENT MESSAGE (OUTPATIENT)
Dept: ENDOCRINOLOGY | Facility: CLINIC | Age: 49
End: 2024-10-29

## 2024-10-29 ENCOUNTER — OFFICE VISIT (OUTPATIENT)
Dept: OBGYN CLINIC | Facility: CLINIC | Age: 49
End: 2024-10-29
Payer: COMMERCIAL

## 2024-10-29 ENCOUNTER — TELEPHONE (OUTPATIENT)
Dept: FAMILY MEDICINE CLINIC | Facility: CLINIC | Age: 49
End: 2024-10-29

## 2024-10-29 VITALS
WEIGHT: 193.8 LBS | SYSTOLIC BLOOD PRESSURE: 134 MMHG | DIASTOLIC BLOOD PRESSURE: 88 MMHG | BODY MASS INDEX: 31.15 KG/M2 | HEIGHT: 66 IN

## 2024-10-29 DIAGNOSIS — N94.10 DYSPAREUNIA IN FEMALE: ICD-10-CM

## 2024-10-29 DIAGNOSIS — N95.2 VAGINAL ATROPHY: ICD-10-CM

## 2024-10-29 DIAGNOSIS — Z78.0 POSTMENOPAUSAL: Primary | ICD-10-CM

## 2024-10-29 LAB
25(OH)D3 SERPL-MCNC: 35 NG/ML (ref 30–100)
ALBUMIN SERPL-MCNC: 4.4 G/DL (ref 3.6–5.1)
ALBUMIN/GLOB SERPL: 1.6 (CALC) (ref 1–2.5)
ALP SERPL-CCNC: 60 U/L (ref 37–153)
ALT SERPL-CCNC: 17 U/L (ref 6–29)
AST SERPL-CCNC: 17 U/L (ref 10–35)
BILIRUB SERPL-MCNC: 0.7 MG/DL (ref 0.2–1.2)
BUN SERPL-MCNC: 15 MG/DL (ref 7–25)
BUN/CREAT SERPL: NORMAL (CALC) (ref 6–22)
CALCIUM SERPL-MCNC: 9.3 MG/DL (ref 8.6–10.4)
CHLORIDE SERPL-SCNC: 105 MMOL/L (ref 98–110)
CHOLEST SERPL-MCNC: 245 MG/DL
CHOLEST/HDLC SERPL: 5.2 (CALC)
CO2 SERPL-SCNC: 26 MMOL/L (ref 20–32)
CREAT SERPL-MCNC: 0.8 MG/DL (ref 0.5–1.03)
GFR/BSA.PRED SERPLBLD CYS-BASED-ARV: 89 ML/MIN/1.73M2
GLOBULIN SER CALC-MCNC: 2.7 G/DL (CALC) (ref 1.9–3.7)
GLUCOSE SERPL-MCNC: 88 MG/DL (ref 65–99)
HBA1C MFR BLD: 6 % OF TOTAL HGB
HDLC SERPL-MCNC: 47 MG/DL
LDLC SERPL CALC-MCNC: 157 MG/DL (CALC)
NONHDLC SERPL-MCNC: 198 MG/DL (CALC)
POTASSIUM SERPL-SCNC: 4.1 MMOL/L (ref 3.5–5.3)
PROT SERPL-MCNC: 7.1 G/DL (ref 6.1–8.1)
SODIUM SERPL-SCNC: 140 MMOL/L (ref 135–146)
T4 FREE SERPL-MCNC: 1.2 NG/DL (ref 0.8–1.8)
TRIGL SERPL-MCNC: 252 MG/DL
TSH SERPL-ACNC: 2.19 MIU/L

## 2024-10-29 PROCEDURE — 99214 OFFICE O/P EST MOD 30 MIN: CPT | Performed by: PHYSICIAN ASSISTANT

## 2024-10-29 RX ORDER — ESTRADIOL 0.05 MG/D
1 PATCH TRANSDERMAL WEEKLY
Qty: 4 PATCH | Refills: 5 | Status: SHIPPED | OUTPATIENT
Start: 2024-10-29

## 2024-10-29 NOTE — PROGRESS NOTES
Assessment/Plan:      Diagnoses and all orders for this visit:    Postmenopausal  -     estradiol (Climara) 0.05 mg/24 hr; Place 1 patch on the skin over 7 days once a week    Vaginal atrophy  -     estradiol (Climara) 0.05 mg/24 hr; Place 1 patch on the skin over 7 days once a week    Dyspareunia in female  -     estradiol (Climara) 0.05 mg/24 hr; Place 1 patch on the skin over 7 days once a week          Subjective:     Patient ID: Winnie Lester is a 49 y.o. female.    Pt presents for a discussion today  She is interested in HRT  She had a hyster ~ age 36 for severe endometriosis  She took hormones for less than 1 year, but wanted to go more natural  She also has h/o thyroid issues  She has managed everything naturally for 10+ years  Over the last year, however, pt complains of increased weight gain and painful intercourse  Her tissues are feeling dry and irritated  She denies major hot flashes/night sweats, but pt does not sleep well  She is working with PCP and endo  Her A1c is elevated  Before addressing her other health issues, she would like to trial some hrt    Pills vs patch vs cream discussed  Pt trial estrace cream in the past and did not like it  Rx given for climara .05mg patch  Daily ca, d, b complex  Referral to jessica        Review of Systems   Constitutional:  Negative for chills, fever and unexpected weight change.   HENT:  Negative for ear pain and sore throat.    Eyes:  Negative for pain and visual disturbance.   Respiratory:  Negative for cough and shortness of breath.    Cardiovascular:  Negative for chest pain and palpitations.   Gastrointestinal:  Negative for abdominal pain, blood in stool, constipation, diarrhea and vomiting.   Genitourinary: Negative.  Negative for dysuria and hematuria.   Musculoskeletal:  Negative for arthralgias and back pain.   Skin:  Negative for color change and rash.   Neurological:  Negative for seizures and syncope.   All other systems reviewed and are  negative.        Objective:     Physical Exam  Vitals and nursing note reviewed.   Constitutional:       Appearance: Normal appearance. She is well-developed.   HENT:      Head: Normocephalic and atraumatic.   Chest:   Breasts:     Right: No inverted nipple, mass, nipple discharge or skin change.      Left: No inverted nipple, mass, nipple discharge or skin change.   Abdominal:      Palpations: Abdomen is soft.   Genitourinary:     Exam position: Supine.      Labia:         Right: No rash, tenderness or lesion.         Left: No rash, tenderness or lesion.       Vagina: Normal.      Cervix: No cervical motion tenderness, discharge or friability.      Adnexa:         Right: No mass, tenderness or fullness.          Left: No mass, tenderness or fullness.     Musculoskeletal:      Cervical back: Normal range of motion.   Lymphadenopathy:      Lower Body: No right inguinal adenopathy. No left inguinal adenopathy.   Neurological:      Mental Status: She is alert.

## 2024-10-29 NOTE — TELEPHONE ENCOUNTER
----- Message from Chaim Pierce DO sent at 10/29/2024  8:01 AM EDT -----  Please call the patient regarding her abnormal result.  Cholesterol is significantly worse.  She should still be taking atorvastatin 20 mg.  Does she need refill?

## 2024-10-29 NOTE — PATIENT COMMUNICATION
Called and LM with patient to discuss trying to chuy her in before the end of the year, asked if she would be open to meeting with TONI Quach in hopes of getting her in before the end of the year.

## 2024-10-29 NOTE — TELEPHONE ENCOUNTER
Patient was retuning a missed call, I advised her of the message from the Provider and she said that she has stopped taking the atorvastatin a while ago. She wanted to come in and see the provider so we got her scheduled for a visit next week to further discuss. Thank you.    Message from Chaim Pierce DO sent at 10/29/2024  8:01 AM EDT -----  Please call the patient regarding her abnormal result.  Cholesterol is significantly worse.  She should still be taking atorvastatin 20 mg.  Does she need refill?

## 2024-10-30 ENCOUNTER — OFFICE VISIT (OUTPATIENT)
Dept: ENDOCRINOLOGY | Facility: CLINIC | Age: 49
End: 2024-10-30
Payer: COMMERCIAL

## 2024-10-30 VITALS
HEIGHT: 66 IN | HEART RATE: 87 BPM | WEIGHT: 194.8 LBS | SYSTOLIC BLOOD PRESSURE: 120 MMHG | OXYGEN SATURATION: 98 % | DIASTOLIC BLOOD PRESSURE: 80 MMHG | BODY MASS INDEX: 31.31 KG/M2

## 2024-10-30 DIAGNOSIS — Z78.0 POSTMENOPAUSAL: ICD-10-CM

## 2024-10-30 DIAGNOSIS — E66.811 CLASS 1 OBESITY WITH BODY MASS INDEX (BMI) OF 31.0 TO 31.9 IN ADULT, UNSPECIFIED OBESITY TYPE, UNSPECIFIED WHETHER SERIOUS COMORBIDITY PRESENT: ICD-10-CM

## 2024-10-30 DIAGNOSIS — E78.2 MIXED HYPERLIPIDEMIA: ICD-10-CM

## 2024-10-30 DIAGNOSIS — E04.2 MULTIPLE THYROID NODULES: ICD-10-CM

## 2024-10-30 DIAGNOSIS — R63.5 WEIGHT GAIN: Primary | ICD-10-CM

## 2024-10-30 DIAGNOSIS — R73.03 PRE-DIABETES: ICD-10-CM

## 2024-10-30 PROCEDURE — 99214 OFFICE O/P EST MOD 30 MIN: CPT | Performed by: INTERNAL MEDICINE

## 2024-10-30 RX ORDER — DEXAMETHASONE 1 MG
1 TABLET ORAL ONCE
Qty: 1 TABLET | Refills: 0 | Status: SHIPPED | OUTPATIENT
Start: 2024-10-30 | End: 2024-10-30

## 2024-10-30 NOTE — PATIENT INSTRUCTIONS
a tablet of dexamethasone from the pharmacy      Take the 1 mg dexamethasone tablet at 11 PM.      Obtain bloodwork by 9 AM the next morning, for serum cortisol. The bloodwork must be done early in the morning, otherwise the effect of the tablet will wear off.       Salivary cortisol - sample collection  - Do not brush your teeth before collecting the sample.  - Do not eat or drink 15 minutes before collecting the sample.  - Do not touch the swab with your fingers.  - Collect the sample between 11 p.m. and midnight.

## 2024-10-30 NOTE — PROGRESS NOTES
Winnie Lester 49 y.o. female MRN: [id number]    Encounter: [id number]      Assessment & Plan     Thyroid nodule  Thyroid function tests within normal limits  Ultrasound thyroid as scheduled    2.  Weight gain, obesity, BMI 31  3.  Prediabetes A1c 6.0%  4.  Mixed hyperlipidemia  5. History of abnormal cortisol levels  -Perform dexamethasone suppression test, midnight salivary cortisol x 3  If within normal limits, will plan to start tirzepatide/Zepbound  -Continue healthy lifestyle, consistent carb diet, regular exercise  -agree with re-starting statin medication    Follow up in 3 months     CC: thyroid nodule, weigh gain       History of Present Illness     HPI:  Winnie Lester is a 49 y.o. female who is here for a follow-up of thyroid nodule, weight gain/obesity    Last seen on 10/2/2024 see prior notes for details    Met with Gyn yesterday, will be starting HRT patch   Will be having thyroid USG on Monday     [facility] [place]   Was using max 1.25 mg of Wegovy (Feb to August 2023)-  + Bio-T testosterone pellet 162.5 mg(last inserted 6/2023)  Had some reflux with Wegovy which was better with over-the-counter PPI.  Denied having any other symptoms with the medication    At this time, no change in symptoms from the prior visit   Weight able over the last 4 weeks  Exercises regularly  In the past, states that she has had multiple cortisol levels checked-serum as well as salivary cortisol at different times of the day, was told that it was high.  Advised stress reduction.  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  Was not evaluated further.    Hyperlipidemia: Used to take statin medication which she is currently not on, advised to restart    FH: mother with h/o HF (after COVID)  Maternal GF h/o CAD in his 60s  paternal uncle with diabetes mellitus   Maternal GM - pancreatic c/a     All other systems were reviewed and are negative.       Review of  Systems    Historical Information   Past Medical History:   Diagnosis Date    Endometriosis     Fibroid     High cholesterol     Hyperthyroidism     Hypothyroidism     Migraine     Varicella      Past Surgical History:   Procedure Laterality Date    APPENDECTOMY  2013    COLONOSCOPY      HYSTERECTOMY  2013    OOPHORECTOMY       Social History   Social History     Substance and Sexual Activity   Alcohol Use Yes    Comment: 1-2 drinks monthly, Occasional social drinker     Social History     Substance and Sexual Activity   Drug Use Never     Social History     Tobacco Use   Smoking Status Former    Current packs/day: 0.00    Average packs/day: 1 pack/day for 5.0 years (5.0 ttl pk-yrs)    Types: Cigarettes    Quit date: 1995    Years since quittin.8   Smokeless Tobacco Never   Tobacco Comments    Haven't smoked for over 30 years     Family History:   Family History   Problem Relation Age of Onset    Heart failure Mother     Parkinsonism Father     Cancer Father         Bladder cancer    Parkinsonism Sister     No Known Problems Maternal Aunt     No Known Problems Maternal Aunt     No Known Problems Maternal Aunt     No Known Problems Maternal Aunt     Parkinsonism Paternal Aunt     Parkinsonism Paternal Aunt     Parkinsonism Paternal Uncle     Diabetes type II Paternal Uncle     Pancreatic cancer Maternal Grandmother     Cancer Maternal Grandmother         Pancreas Cancer    Bone cancer Paternal Grandmother     Lung cancer Paternal Grandmother     Cancer Paternal Grandmother         Lung cancer       Meds/Allergies   Current Outpatient Medications   Medication Sig Dispense Refill    estradiol (Climara) 0.05 mg/24 hr Place 1 patch on the skin over 7 days once a week 4 patch 5    atorvastatin (LIPITOR) 20 mg tablet Take 1 tablet (20 mg total) by mouth daily 90 tablet 3    naproxen (Naprosyn) 500 mg tablet Take 1 tablet (500 mg total) by mouth 2 (two) times a day with meals (Patient not taking: Reported on  "10/29/2024) 60 tablet 3    valACYclovir (VALTREX) 1,000 mg tablet Take 1 tablet twice daily for 1 day at the first sign of infection (Patient not taking: Reported on 10/29/2024) 60 tablet 1     No current facility-administered medications for this visit.     Allergies   Allergen Reactions    Hydrocodone Nausea Only    Medical Tape Rash     Tagederm - rashes    Nickel Rash       Objective   Vitals: Blood pressure 120/80, pulse 87, height 5' 6\" (1.676 m), weight 88.4 kg (194 lb 12.8 oz), SpO2 98%.    Physical Exam  Constitutional:       Appearance: She is well-developed.   HENT:      Head: Normocephalic and atraumatic.   Eyes:      Conjunctiva/sclera: Conjunctivae normal.   Neck:      Thyroid: No thyromegaly.   Cardiovascular:      Rate and Rhythm: Normal rate and regular rhythm.      Heart sounds: Normal heart sounds. No murmur heard.  Pulmonary:      Effort: Pulmonary effort is normal.      Breath sounds: Normal breath sounds. No wheezing.   Abdominal:      General: There is no distension.      Palpations: Abdomen is soft.      Tenderness: There is no abdominal tenderness.   Musculoskeletal:         General: Normal range of motion.      Cervical back: Normal range of motion and neck supple.   Skin:     General: Skin is warm and dry.   Neurological:      Mental Status: She is alert and oriented to person, place, and time.   Psychiatric:         Behavior: Behavior normal.         The history was obtained from the review of the chart, patient.    Lab Results:   Lab Results   Component Value Date/Time    TSH W/RFX TO FREE T4 2.29 11/30/2023 08:49 AM    Free t4 1.2 10/28/2024 08:46 AM     Lab Results   Component Value Date    WBC 5.9 11/30/2023    HGB 14.1 11/30/2023    HCT 42.6 11/30/2023    MCV 85.2 11/30/2023     11/30/2023     Lab Results   Component Value Date    CREATININE 0.80 10/28/2024    BUN 15 10/28/2024    K 4.1 10/28/2024     10/28/2024    CO2 26 10/28/2024     Lab Results   Component Value Date " "   HGBA1C 6.0 (H) 10/28/2024         Imaging Studies:   Results for orders placed during the hospital encounter of 11/20/23    US thyroid    Impression  No nodule meets current ACR criteria for requiring biopsy but followup ultrasound is recommended in 1 year.        Reference: ACR Thyroid Imaging, Reporting and Data System (TI-RADS): White Paper of the ACR TI-RADS Committee. J AM Paula Radiol 2017;14:587-595. (additional recommendations based on American Thyroid Association 2015 guidelines.)      Workstation performed: [id number]      Results Review Statement: No pertinent imaging studies reviewed.    Portions of the record may have been created with voice recognition software. Occasional wrong word or \"sound a like\" substitutions may have occurred due to the inherent limitations of voice recognition software. Read the chart carefully and recognize, using context, where substitutions have occurred.    "

## 2024-10-31 DIAGNOSIS — E78.2 MIXED HYPERLIPIDEMIA: ICD-10-CM

## 2024-10-31 RX ORDER — ATORVASTATIN CALCIUM 20 MG/1
20 TABLET, FILM COATED ORAL DAILY
Qty: 90 TABLET | Refills: 3 | Status: SHIPPED | OUTPATIENT
Start: 2024-10-31

## 2024-11-04 ENCOUNTER — HOSPITAL ENCOUNTER (OUTPATIENT)
Dept: RADIOLOGY | Facility: HOSPITAL | Age: 49
Discharge: HOME | End: 2024-11-04
Attending: INTERNAL MEDICINE
Payer: COMMERCIAL

## 2024-11-04 DIAGNOSIS — E04.2 MULTIPLE THYROID NODULES: ICD-10-CM

## 2024-11-04 PROCEDURE — 76536 US EXAM OF HEAD AND NECK: CPT

## 2024-11-13 ENCOUNTER — TELEPHONE (OUTPATIENT)
Age: 49
End: 2024-11-13

## 2024-11-13 NOTE — TELEPHONE ENCOUNTER
Patient calling to report difficulty with getting Climara patch to stick for full duration. Notes patch has been falling off early. Reviewed skin prep, optional locations, and window tape if necessary. Advised not to cover the patient and to apply at night. Patient verbalizes understanding.

## 2024-11-18 ENCOUNTER — TELEPHONE (OUTPATIENT)
Age: 49
End: 2024-11-18

## 2024-11-18 NOTE — TELEPHONE ENCOUNTER
I called and spoke with the lab and informed that it is suppose to be 3 tubes not just one. Thank you

## 2024-11-18 NOTE — TELEPHONE ENCOUNTER
Ever from [facility] called in stating that pt is at lab and wasn't sure of how many specimens she was supposed to bring in for her Salivary Cortisol. I was unable to reach a team member at the time, please call Ever back.    [phone]

## 2024-12-02 ENCOUNTER — RESULTS FOLLOW-UP (OUTPATIENT)
Dept: ENDOCRINOLOGY | Facility: CLINIC | Age: 49
End: 2024-12-02

## 2024-12-02 DIAGNOSIS — R73.03 PRE-DIABETES: ICD-10-CM

## 2024-12-02 DIAGNOSIS — E66.811 CLASS 1 OBESITY WITH BODY MASS INDEX (BMI) OF 31.0 TO 31.9 IN ADULT, UNSPECIFIED OBESITY TYPE, UNSPECIFIED WHETHER SERIOUS COMORBIDITY PRESENT: Primary | ICD-10-CM

## 2024-12-02 RX ORDER — TIRZEPATIDE 2.5 MG/.5ML
2.5 INJECTION, SOLUTION SUBCUTANEOUS WEEKLY
Qty: 2 ML | Refills: 0 | Status: SHIPPED | OUTPATIENT
Start: 2024-12-02 | End: 2024-12-06 | Stop reason: SDUPTHER

## 2024-12-06 DIAGNOSIS — R73.03 PRE-DIABETES: ICD-10-CM

## 2024-12-06 DIAGNOSIS — E66.811 CLASS 1 OBESITY WITH BODY MASS INDEX (BMI) OF 31.0 TO 31.9 IN ADULT, UNSPECIFIED OBESITY TYPE, UNSPECIFIED WHETHER SERIOUS COMORBIDITY PRESENT: ICD-10-CM

## 2024-12-06 RX ORDER — TIRZEPATIDE 2.5 MG/.5ML
2.5 INJECTION, SOLUTION SUBCUTANEOUS WEEKLY
Qty: 2 ML | Refills: 0 | Status: SHIPPED | OUTPATIENT
Start: 2024-12-06 | End: 2025-01-03

## 2025-04-18 ENCOUNTER — OFFICE VISIT (OUTPATIENT)
Dept: OBGYN CLINIC | Facility: CLINIC | Age: 50
End: 2025-04-18
Payer: COMMERCIAL

## 2025-04-18 VITALS — SYSTOLIC BLOOD PRESSURE: 122 MMHG | DIASTOLIC BLOOD PRESSURE: 84 MMHG

## 2025-04-18 DIAGNOSIS — Z78.0 POSTMENOPAUSAL: ICD-10-CM

## 2025-04-18 DIAGNOSIS — N94.10 DYSPAREUNIA IN FEMALE: ICD-10-CM

## 2025-04-18 DIAGNOSIS — N95.2 VAGINAL ATROPHY: ICD-10-CM

## 2025-04-18 PROCEDURE — 99213 OFFICE O/P EST LOW 20 MIN: CPT | Performed by: PHYSICIAN ASSISTANT

## 2025-04-18 RX ORDER — ESTRADIOL 0.05 MG/D
1 PATCH TRANSDERMAL WEEKLY
Qty: 12 PATCH | Refills: 1 | Status: SHIPPED | OUTPATIENT
Start: 2025-04-18

## 2025-04-23 NOTE — PROGRESS NOTES
:  Assessment & Plan  Postmenopausal    Orders:    estradiol (Climara) 0.05 mg/24 hr; Place 1 patch on the skin over 7 days once a week    Vaginal atrophy    Orders:    estradiol (Climara) 0.05 mg/24 hr; Place 1 patch on the skin over 7 days once a week    Dyspareunia in female    Orders:    estradiol (Climara) 0.05 mg/24 hr; Place 1 patch on the skin over 7 days once a week        History of Present Illness     Winnie Lester is a 49 y.o. female   Pt presents for follow up today  She started an HRT patch a few months ago and states that she is feeling MUCH better  Mood improved  Vasomotor sxs gone  MR and pt are very happy and would like refill at this time      Review of Systems   Constitutional:  Negative for chills, fever and unexpected weight change.   HENT:  Negative for ear pain and sore throat.    Eyes:  Negative for pain and visual disturbance.   Respiratory:  Negative for cough and shortness of breath.    Cardiovascular:  Negative for chest pain and palpitations.   Gastrointestinal:  Negative for abdominal pain, blood in stool, constipation, diarrhea and vomiting.   Genitourinary: Negative.  Negative for dysuria and hematuria.   Musculoskeletal:  Negative for arthralgias and back pain.   Skin:  Negative for color change and rash.   Neurological:  Negative for seizures and syncope.   All other systems reviewed and are negative.    Objective   /84 (BP Location: Left arm, Patient Position: Sitting, Cuff Size: Standard)      Physical Exam  Vitals and nursing note reviewed.   Constitutional:       Appearance: Normal appearance.   Neurological:      Mental Status: She is alert.

## 2025-04-23 NOTE — ASSESSMENT & PLAN NOTE
Orders:    estradiol (Climara) 0.05 mg/24 hr; Place 1 patch on the skin over 7 days once a week